# Patient Record
Sex: FEMALE | Race: WHITE | HISPANIC OR LATINO | Employment: FULL TIME | URBAN - NONMETROPOLITAN AREA
[De-identification: names, ages, dates, MRNs, and addresses within clinical notes are randomized per-mention and may not be internally consistent; named-entity substitution may affect disease eponyms.]

---

## 2019-09-04 ENCOUNTER — TRANSCRIBE ORDERS (OUTPATIENT)
Dept: PHYSICAL THERAPY | Facility: CLINIC | Age: 49
End: 2019-09-04

## 2019-09-04 ENCOUNTER — EVALUATION (OUTPATIENT)
Dept: PHYSICAL THERAPY | Facility: CLINIC | Age: 49
End: 2019-09-04
Payer: COMMERCIAL

## 2019-09-04 DIAGNOSIS — Z98.890 STATUS POST OPEN REDUCTION WITH INTERNAL FIXATION (ORIF) OF FRACTURE OF ANKLE: ICD-10-CM

## 2019-09-04 DIAGNOSIS — Z87.81 STATUS POST OPEN REDUCTION WITH INTERNAL FIXATION (ORIF) OF FRACTURE OF ANKLE: ICD-10-CM

## 2019-09-04 DIAGNOSIS — S82.392F FRACTURE OF POSTERIOR MALLEOLUS, LEFT, OPEN TYPE III, WITH ROUTINE HEALING, SUBSEQUENT ENCOUNTER: Primary | ICD-10-CM

## 2019-09-04 PROCEDURE — 97110 THERAPEUTIC EXERCISES: CPT | Performed by: PHYSICAL THERAPIST

## 2019-09-04 PROCEDURE — 97010 HOT OR COLD PACKS THERAPY: CPT | Performed by: PHYSICAL THERAPIST

## 2019-09-04 PROCEDURE — 97140 MANUAL THERAPY 1/> REGIONS: CPT | Performed by: PHYSICAL THERAPIST

## 2019-09-04 PROCEDURE — 97116 GAIT TRAINING THERAPY: CPT | Performed by: PHYSICAL THERAPIST

## 2019-09-04 PROCEDURE — 97162 PT EVAL MOD COMPLEX 30 MIN: CPT | Performed by: PHYSICAL THERAPIST

## 2019-09-04 NOTE — LETTER
2019    Camryn Cui MD  [address]    Patient: Bill Chirinos   YOB: 1970   Date of Visit: 2019     Encounter Diagnosis     ICD-10-CM    1  Fracture of posterior malleolus, left, open type III, with routine healing, subsequent encounter S82 392F    2  Status post open reduction with internal fixation (ORIF) of fracture of ankle Z96 7     Z87 81        Dear Dr Miramontes Slider: Thank you for your recent referral of Bill Chirinos  Please review the attached evaluation summary from [place]'s recent visit  Please verify that you agree with the plan of care by signing the attached order  If you have any questions or concerns, please do not hesitate to call  I sincerely appreciate the opportunity to share in the care of one of your patients and hope to have another opportunity to work with you in the near future  Sincerely,    Ana Santizo, PT      Referring Provider:      I certify that I have read the below Plan of Care and certify the need for these services furnished under this plan of treatment while under my care  Camryn Cui MD  [address]: [phone]          PT Evaluation     Today's date: 2019  Patient name: Bill Chirinos  : 1970  MRN: [id number]  Referring provider: Krissy Graham MD  Dx:   Encounter Diagnosis     ICD-10-CM    1  Fracture of posterior malleolus, left, open type III, with routine healing, subsequent encounter S82 392F    2  Status post open reduction with internal fixation (ORIF) of fracture of ankle Z96 7     Z87 81        Start Time: 1400  Stop Time: 1515  Total time in clinic (min): 75 minutes    Assessment  Assessment details: Bill Chirinos 46 y o  female  Is post ORIF for type III open fracture of distal left tibia 2019   The patient fell at home when her porch collapsed 2019 and she twisted and fractured her ankle acute/chronic history of left right ankle  The patient has been wearing boot since her surgery  She is ambulating with roller walker, and his tentative on bearing weight on her left leg  She has been referred to PT for AROM, stretching, strengthening, and progress in weight bearing, as tolerated  /76 HR 95 Ht  5'8 Wt  235  Left ankle pain rated 4/10 during weight bearing  Left Ankle AROM: DF 0/10  PF 28/50 IV 4/30 EV 2/15     Left ankle Ankle Strength: DF=-4 , PF=-2, IV= 1, EV= 1  Swelling Left ankle  Circumference measurements mid joint  26 3 cm mid foot 24 8  Gait pattern is cautious with decreased step length and speed  Goals  STG:  (1) Reduce left ankle pain by 25% 2 weeks  (2) Reduce and control swelling in  left ankle by 1 cm 2 weeks  (3) increase active ROM right left ankle by 5-8 degrees in all planes 3 weeks  (4) increase strength  left ankle by 1/2 grade 4 weeks  (5) progress in weight bearing per orthopedic recommendations  WBAT  - day one and ongoing  (6) improve gait pattern with  Boot and progress without  Walker - when permitted)    (7) increase ambulation tolerance to 20 min  of continuous walking    LTG  Maximize AROM and strength left ankle by discharge  Promote ambulation without boot and device by discharge  Resume normal functional activity, including driving, work, and recreation    Plan  Plan details:   2-3 X per week 6 weeks  MT - STM, stretching  TE stretching, ROM and strengthening include HEP  GT         Subjective Patient is post ORIF for left ankle fracture     Objective   Left ankle pain rated 4/10 during weight bearing  Left Ankle AROM: DF 0/10  PF 28/50 IV 4/30 EV 2/15     Left ankle Ankle Strength: DF=-4 , PF=-2, IV= 1, EV= 1  Swelling Left ankle  Circumference measurements mid joint  26 3 cm mid foot 24 8                     Manual  9/4       STM 5       AAROM 10       MT                            Exercise Diary  9/4       Ankle pumps 30X       Ankle IV/EV Towel stretch knee straight 5 X 10'       Towel stretch knee bent 5 X 10'               T-band PF LV 2 3/10                                       Gait 10                           Modalities 9/4       Cold pack 10

## 2019-09-04 NOTE — PROGRESS NOTES
PT Evaluation     Today's date: 2019  Patient name: Marcia Mckeon  : 1970  MRN: [id number]  Referring provider: Lou Ascencio MD  Dx:   Encounter Diagnosis     ICD-10-CM    1  Fracture of posterior malleolus, left, open type III, with routine healing, subsequent encounter S82 392F    2  Status post open reduction with internal fixation (ORIF) of fracture of ankle Z96 7     Z87 81        Start Time: 1400  Stop Time: 1515  Total time in clinic (min): 75 minutes    Assessment  Assessment details: Marcia Mckeon 46 y o  female  Is post ORIF for type III open fracture of distal left tibia 2019  The patient fell at home when her porch collapsed 2019 and she twisted and fractured her ankle  acute/chronic history of left right ankle  The patient has been wearing boot since her surgery  She is ambulating with roller walker, and his tentative on bearing weight on her left leg  She has been referred to PT for AROM, stretching, strengthening, and progress in weight bearing, as tolerated  /76 HR 95 Ht  5'8 Wt  235  Left ankle pain rated 4/10 during weight bearing  Left Ankle AROM: DF 0/10  PF 28/50 IV 4/30 EV 2/15     Left ankle Ankle Strength: DF=-4 , PF=-2, IV= 1, EV= 1  Swelling Left ankle  Circumference measurements mid joint  26 3 cm mid foot 24 8  Gait pattern is cautious with decreased step length and speed  Goals  STG:  (1) Reduce left ankle pain by 25% 2 weeks  (2) Reduce and control swelling in  left ankle by 1 cm 2 weeks  (3) increase active ROM right left ankle by 5-8 degrees in all planes 3 weeks  (4) increase strength  left ankle by 1/2 grade 4 weeks  (5) progress in weight bearing per orthopedic recommendations  WBAT  - day one and ongoing  (6) improve gait pattern with  Boot and progress without  Walker - when permitted)    (7) increase ambulation tolerance to 20 min   of continuous walking    LTG  Maximize AROM and strength left ankle by discharge  Promote ambulation without boot and device by discharge  Resume normal functional activity, including driving, work, and recreation    Plan  Plan details:   2-3 X per week 6 weeks  MT - STM, stretching  TE stretching, ROM and strengthening include HEP  GT         Subjective Patient is post ORIF for left ankle fracture     Objective   Left ankle pain rated 4/10 during weight bearing  Left Ankle AROM: DF 0/10  PF 28/50 IV 4/30 EV 2/15     Left ankle Ankle Strength: DF=-4 , PF=-2, IV= 1, EV= 1  Swelling Left ankle  Circumference measurements mid joint  26 3 cm mid foot 24 8                     Manual  9/4       STM 5       AAROM 10       MT                            Exercise Diary  9/4       Ankle pumps 30X       Ankle IV/EV                Towel stretch knee straight 5 X 10'       Towel stretch knee bent 5 X 10'               T-band PF LV 2 3/10                                       Gait 10                           Modalities 9/4       Cold pack 10

## 2019-09-06 ENCOUNTER — OFFICE VISIT (OUTPATIENT)
Dept: PHYSICAL THERAPY | Facility: CLINIC | Age: 49
End: 2019-09-06
Payer: COMMERCIAL

## 2019-09-06 DIAGNOSIS — Z87.81 STATUS POST OPEN REDUCTION WITH INTERNAL FIXATION (ORIF) OF FRACTURE OF ANKLE: ICD-10-CM

## 2019-09-06 DIAGNOSIS — Z98.890 STATUS POST OPEN REDUCTION WITH INTERNAL FIXATION (ORIF) OF FRACTURE OF ANKLE: ICD-10-CM

## 2019-09-06 DIAGNOSIS — S82.392F FRACTURE OF POSTERIOR MALLEOLUS, LEFT, OPEN TYPE III, WITH ROUTINE HEALING, SUBSEQUENT ENCOUNTER: Primary | ICD-10-CM

## 2019-09-06 PROCEDURE — 97010 HOT OR COLD PACKS THERAPY: CPT | Performed by: PHYSICAL THERAPIST

## 2019-09-06 PROCEDURE — 97140 MANUAL THERAPY 1/> REGIONS: CPT | Performed by: PHYSICAL THERAPIST

## 2019-09-06 PROCEDURE — 97110 THERAPEUTIC EXERCISES: CPT | Performed by: PHYSICAL THERAPIST

## 2019-09-06 NOTE — PROGRESS NOTES
Daily Note     Today's date: 2019  Patient name: Bill Chirinos  : 1970  MRN: [id number]  Referring provider: Krissy Graham MD  Dx:   Encounter Diagnosis     ICD-10-CM    1  Fracture of posterior malleolus, left, open type III, with routine healing, subsequent encounter S82 392F    2  Status post open reduction with internal fixation (ORIF) of fracture of ankle Z96 7     Z87 81        Start Time: 1030  Stop Time: 1130  Total time in clinic (min): 60 minutes    Subjective: Patient has been performing HEP  She is more confident with ambulation using roller walker      Objective: PT repeated MT and directed exercises  Assessment: Tolerated treatment well  Plan: Continue per plan of care        Manual           STM 5  10         AAROM 10  5         MT    5                                           Exercise Diary           Ankle pumps 30X  30X         Ankle IV/EV    30X/30X                       Towel stretch knee straight 5 X 10'  5X 20'         Towel stretch knee bent 5 X 10'  5X 20'          T-band DF    LV2 3/10         T-band PF LV 2 3/10  LV2 3/10          T-band EV/IV    LV2 2/10@                                                   Gait 10                                             Modalities          Cold pack 10  10

## 2019-09-09 ENCOUNTER — OFFICE VISIT (OUTPATIENT)
Dept: PHYSICAL THERAPY | Facility: CLINIC | Age: 49
End: 2019-09-09
Payer: COMMERCIAL

## 2019-09-09 DIAGNOSIS — Z87.81 STATUS POST OPEN REDUCTION WITH INTERNAL FIXATION (ORIF) OF FRACTURE OF ANKLE: ICD-10-CM

## 2019-09-09 DIAGNOSIS — S82.392F FRACTURE OF POSTERIOR MALLEOLUS, LEFT, OPEN TYPE III, WITH ROUTINE HEALING, SUBSEQUENT ENCOUNTER: Primary | ICD-10-CM

## 2019-09-09 DIAGNOSIS — Z98.890 STATUS POST OPEN REDUCTION WITH INTERNAL FIXATION (ORIF) OF FRACTURE OF ANKLE: ICD-10-CM

## 2019-09-09 PROCEDURE — 97110 THERAPEUTIC EXERCISES: CPT | Performed by: PHYSICAL THERAPIST

## 2019-09-09 PROCEDURE — 97140 MANUAL THERAPY 1/> REGIONS: CPT | Performed by: PHYSICAL THERAPIST

## 2019-09-09 PROCEDURE — 97010 HOT OR COLD PACKS THERAPY: CPT | Performed by: PHYSICAL THERAPIST

## 2019-09-09 NOTE — PROGRESS NOTES
Daily Note     Today's date: 2019  Patient name: Nikole Zaman  : 1970  MRN: [id number]  Referring provider: Jones Nielson MD  Dx:   Encounter Diagnosis     ICD-10-CM    1  Fracture of posterior malleolus, left, open type III, with routine healing, subsequent encounter S82 392F    2  Status post open reduction with internal fixation (ORIF) of fracture of ankle Z96 7     Z87 81        Start Time: 1500  Stop Time: 1605  Total time in clinic (min): 65 minutes    Subjective: Patient returns to Dr Alma Quintero this evening  She will question whether or not she can progress beyond use of boot      Objective: PT performed MT and active-assisted ROM in all planes to left ankle  Left Ankle AROM: DF 2/10  PF 30/50 IV  EV 6/15             Assessment: Tolerated treatment well  Patient continues to ambulate with roller walker and left LE boot  She is more tolerant to weight bearing      Plan: Continue per plan of care        Manual         STM 5  10  10       AAROM 10  5  5       MT    5  5                                         Exercise Diary         Ankle pumps 30X  30X  30X       Ankle IV/EV    30X/30X  30X / 30X                     Towel stretch knee straight 5 X 10'  5X 20'  5X 20'       Towel stretch knee bent 5 X 10'  5X 20'  5X 20'        T-band DF    LV2 3/10  LV2 3/10       T-band PF LV 2 3/10  LV2 3/10  LV2 3/10        T-band EV/IV    LV2 2/10@  LV2 3/10                                                 Gait 10                                             Modalities        Cold pack 10  10 10

## 2019-09-11 ENCOUNTER — APPOINTMENT (OUTPATIENT)
Dept: PHYSICAL THERAPY | Facility: CLINIC | Age: 49
End: 2019-09-11
Payer: COMMERCIAL

## 2019-09-12 ENCOUNTER — OFFICE VISIT (OUTPATIENT)
Dept: PHYSICAL THERAPY | Facility: CLINIC | Age: 49
End: 2019-09-12
Payer: COMMERCIAL

## 2019-09-12 DIAGNOSIS — S82.392F FRACTURE OF POSTERIOR MALLEOLUS, LEFT, OPEN TYPE III, WITH ROUTINE HEALING, SUBSEQUENT ENCOUNTER: Primary | ICD-10-CM

## 2019-09-12 DIAGNOSIS — Z87.81 STATUS POST OPEN REDUCTION WITH INTERNAL FIXATION (ORIF) OF FRACTURE OF ANKLE: ICD-10-CM

## 2019-09-12 DIAGNOSIS — Z98.890 STATUS POST OPEN REDUCTION WITH INTERNAL FIXATION (ORIF) OF FRACTURE OF ANKLE: ICD-10-CM

## 2019-09-12 PROCEDURE — 97116 GAIT TRAINING THERAPY: CPT | Performed by: PHYSICAL THERAPIST

## 2019-09-12 PROCEDURE — 97110 THERAPEUTIC EXERCISES: CPT | Performed by: PHYSICAL THERAPIST

## 2019-09-12 PROCEDURE — 97140 MANUAL THERAPY 1/> REGIONS: CPT | Performed by: PHYSICAL THERAPIST

## 2019-09-12 NOTE — PROGRESS NOTES
Daily Note     Today's date: 2019  Patient name: Reba Casey  : 1970  MRN: [id number]  Referring provider: Lillian Mary MD  Dx:   Encounter Diagnosis     ICD-10-CM    1  Fracture of posterior malleolus, left, open type III, with routine healing, subsequent encounter S82 392F    2  Status post open reduction with internal fixation (ORIF) of fracture of ankle Z96 7     Z87 81        Start Time: 1330  Stop Time: 1435  Total time in clinic (min): 65 minutes    Subjective: Patient was examined by Dr Tejas Urena 2019  X-rays showed that hardware is intact without signs of loosening   Alignment is stable   No new fractures   Dr Tejas Urena recommended that patient continue PT, and continue use of boot, but may practice some weight bearing in her sneaker  Objective: PT directed ROM exercises; added quad strengthening exercises  Patient did perform weight shifting exercises with sneaker on left foot, and attempted short distance walking using with a "step-to" gait pattern      Assessment: Tolerated treatment well  Plan: Continue per plan of care        Manual       STM 5  10  10  5     AAROM 10  5  5  5     MT    5  5  5                                       Exercise Diary       Ankle pumps 30X  30X  30X  30X  #1     Ankle IV/EV    30X/30X  30X / 30X  30 X / 30X #1                   Towel stretch knee straight 5 X 10'  5X 20'  5X 20'  5 X 20'     Towel stretch knee bent 5 X 10'  5X 20'  5X 20'  5 X 20'      T-band DF    LV2 310  LV2 3/10  LV2 310     T-band PF LV 2 310  LV2 310  LV2 3/10  LV2 310      T-band EV/IV    LV2 2/10@  LV2 3/10  LV2 310      SAQ        3# 3/10      LAQ        3# 3/10                   Gait 10             weight shift        5       walker/sneaker       5           Modalities      Cold pack 10  10 10 declined

## 2019-09-16 ENCOUNTER — OFFICE VISIT (OUTPATIENT)
Dept: PHYSICAL THERAPY | Facility: CLINIC | Age: 49
End: 2019-09-16
Payer: COMMERCIAL

## 2019-09-16 DIAGNOSIS — Z98.890 STATUS POST OPEN REDUCTION WITH INTERNAL FIXATION (ORIF) OF FRACTURE OF ANKLE: ICD-10-CM

## 2019-09-16 DIAGNOSIS — Z87.81 STATUS POST OPEN REDUCTION WITH INTERNAL FIXATION (ORIF) OF FRACTURE OF ANKLE: ICD-10-CM

## 2019-09-16 DIAGNOSIS — S82.392F FRACTURE OF POSTERIOR MALLEOLUS, LEFT, OPEN TYPE III, WITH ROUTINE HEALING, SUBSEQUENT ENCOUNTER: Primary | ICD-10-CM

## 2019-09-16 PROCEDURE — 97110 THERAPEUTIC EXERCISES: CPT

## 2019-09-16 PROCEDURE — 97140 MANUAL THERAPY 1/> REGIONS: CPT

## 2019-09-16 NOTE — PROGRESS NOTES
Daily Note     Today's date: 2019  Patient name: Ramiro Danielle  : 1970  MRN: [id number]  Referring provider: Steve Garcia MD  Dx:   Encounter Diagnosis     ICD-10-CM    1  Fracture of posterior malleolus, left, open type III, with routine healing, subsequent encounter S82 392F    2  Status post open reduction with internal fixation (ORIF) of fracture of ankle Z96 7     Z87 81        Start Time: 1350  Stop Time: 1445  Total time in clinic (min): 55 minutes    Subjective: Patient continues to ambulate with boot, no c/o pain in foot at this time  Objective: PTA directed patient in LE strengthening and ROM exercise program, See treatment diary below  Held on gait training as shoe was not present  Assessment: Tolerated treatment well  Patient exhibited good technique with therapeutic exercises  Will bring shoe for gait training next session  Plan: Continue per plan of care        Manual     STM 5  10  10  5  5   AAROM 10  5  5  5  5   MT    5  5  5  5                                     Exercise Diary     Ankle pumps 30X  30X  30X  30X  #1  30x 1#   Ankle IV/EV    30X/30X  30X / 30X  30 X / 30X #1  30x 30 1#                 Towel stretch knee straight 5 X 10'  5X 20'  5X 20'  5 X 20'  5x 20"   Towel stretch knee bent 5 X 10'  5X 20'  5X 20'  5 X 20'  5x 20"    T-band DF    LV2 3/10  LV2 3/10  LV2 3/10  L2 3/10   T-band PF LV 2 3/10  LV2 3/10  LV2 3/10  LV2 3/10  L2 3/10    T-band EV/IV    LV2 2/10@  LV2 3/10  LV2 3/10  L2 3/10    SAQ        3# 3/10  3# 3/10    LAQ        3# 3/10  3# 3/10                 Gait 10             weight shift        5  -->     walker/sneaker       5  -->         Modalities    Cold pack 10  10 10 declined Declined

## 2019-09-18 ENCOUNTER — OFFICE VISIT (OUTPATIENT)
Dept: PHYSICAL THERAPY | Facility: CLINIC | Age: 49
End: 2019-09-18
Payer: COMMERCIAL

## 2019-09-18 DIAGNOSIS — Z87.81 STATUS POST OPEN REDUCTION WITH INTERNAL FIXATION (ORIF) OF FRACTURE OF ANKLE: ICD-10-CM

## 2019-09-18 DIAGNOSIS — S82.392F FRACTURE OF POSTERIOR MALLEOLUS, LEFT, OPEN TYPE III, WITH ROUTINE HEALING, SUBSEQUENT ENCOUNTER: Primary | ICD-10-CM

## 2019-09-18 DIAGNOSIS — Z98.890 STATUS POST OPEN REDUCTION WITH INTERNAL FIXATION (ORIF) OF FRACTURE OF ANKLE: ICD-10-CM

## 2019-09-18 PROCEDURE — 97140 MANUAL THERAPY 1/> REGIONS: CPT | Performed by: PHYSICAL THERAPIST

## 2019-09-18 PROCEDURE — 97110 THERAPEUTIC EXERCISES: CPT | Performed by: PHYSICAL THERAPIST

## 2019-09-18 PROCEDURE — 97116 GAIT TRAINING THERAPY: CPT | Performed by: PHYSICAL THERAPIST

## 2019-09-18 NOTE — PROGRESS NOTES
Daily Note     Today's date: 2019  Patient name: Hilario Wesley  : 1970  MRN: [id number]  Referring provider: Brad Sadler MD  Dx:   Encounter Diagnosis     ICD-10-CM    1  Fracture of posterior malleolus, left, open type III, with routine healing, subsequent encounter S82 392F    2  Status post open reduction with internal fixation (ORIF) of fracture of ankle Z96 7     Z87 81        Start Time: 1400  Stop Time: 1455  Total time in clinic (min): 55 minutes    Subjective: Pt reports "I'm okay, just a little pain "       Objective: See treatment diary below      Assessment: Tolerated treatment well  Patient exhibited good technique with therapeutic exercises and would benefit from continued PT  Pt with good tolerance to exercises this visit  Able to ambulate wearing sneaker on left foot  Occasional VC required for proper gait mechanics  Minor pain reported during gait training  Continue to progress as tolerated to achieve set goals  Plan: Continue per plan of care  Progress treatment as tolerated         Manual     STM 5  10  10  5  5   AAROM 5  5  5  5  5   MT 5  5  5  5  5                                   Exercise Diary     Ankle pumps 30x 1#  30X  30X  30X  #1  30x 1#   Ankle IV/EV 30x ea 1#  30X/30X  30X / 30X  30 X / 30X #1  30x 30 1#                Towel stretch knee straight 5x20"  5X 20'  5X 20'  5 X 20'  5x 20"   Towel stretch knee bent 5x20"  5X 20'  5X 20'  5 X 20'  5x 20"    T-band DF L2 3x10  LV2 3/10  LV2 3/10  LV2 3/10  L2 3/10   T-band PF L2 3x10  LV2 3/10  LV2 3/10  LV2 3/10  L2 3/10    T-band EV/IV L2 3x10  LV2 2/10@  LV2 3/10  LV2 3/10  L2 3/10    SAQ 3x10 3#      3# 3/10  3# 3/10    LAQ 3x10 3#      3# 3/10  3# 3/10                Gait              weight shift 5 min       5  -->     walker/sneaker 5 min      5  -->         Modalities    Cold pack Declined   10 10 declined Declined

## 2019-09-19 ENCOUNTER — APPOINTMENT (OUTPATIENT)
Dept: PHYSICAL THERAPY | Facility: CLINIC | Age: 49
End: 2019-09-19
Payer: COMMERCIAL

## 2019-09-23 ENCOUNTER — OFFICE VISIT (OUTPATIENT)
Dept: PHYSICAL THERAPY | Facility: CLINIC | Age: 49
End: 2019-09-23
Payer: COMMERCIAL

## 2019-09-23 DIAGNOSIS — S82.392F FRACTURE OF POSTERIOR MALLEOLUS, LEFT, OPEN TYPE III, WITH ROUTINE HEALING, SUBSEQUENT ENCOUNTER: Primary | ICD-10-CM

## 2019-09-23 DIAGNOSIS — Z98.890 STATUS POST OPEN REDUCTION WITH INTERNAL FIXATION (ORIF) OF FRACTURE OF ANKLE: ICD-10-CM

## 2019-09-23 DIAGNOSIS — Z87.81 STATUS POST OPEN REDUCTION WITH INTERNAL FIXATION (ORIF) OF FRACTURE OF ANKLE: ICD-10-CM

## 2019-09-23 PROCEDURE — 97110 THERAPEUTIC EXERCISES: CPT

## 2019-09-23 PROCEDURE — 97116 GAIT TRAINING THERAPY: CPT

## 2019-09-23 PROCEDURE — 97140 MANUAL THERAPY 1/> REGIONS: CPT

## 2019-09-23 NOTE — PROGRESS NOTES
Daily Note     Today's date: 2019  Patient name: Landy Cordero  : 1970  MRN: [id number]  Referring provider: Mak Quezada MD  Dx:   Encounter Diagnosis     ICD-10-CM    1  Fracture of posterior malleolus, left, open type III, with routine healing, subsequent encounter S82 392F    2  Status post open reduction with internal fixation (ORIF) of fracture of ankle Z96 7     Z87 81                   Subjective: Pt states she has mild/moderate discomfort with weightbearing while in the boot  Objective: See treatment diary below  Initiated Nu-step per PT(TK) approval        Assessment: Tolerated treatment fair  Patient would benefit from continued PT      Plan: Continue per plan of care        Manual     STM 5  5  10  5  5   AAROM 5  5  5  5  5   MT 5  5  5  5  5                                   Exercise Diary     Ankle pumps 30x 1# 1 5# 30x  30X  30X  #1  30x 1#   Ankle IV/EV 30x ea 1# 1 5# 30x  30X / 30X  30 X / 30X #1  30x 30 1#                Towel stretch knee straight 5x20"  5x 20"  5X 20'  5 X 20'  5x 20"   Towel stretch knee bent 5x20"  5x 20"  5X 20'  5 X 20'  5x 20"    T-band DF L2 3x10  L3 3/10  LV2 3/10  LV2 3/10  L2 3/10   T-band PF L2 3x10  L3 3/10  LV2 3/10  LV2 3/10  L2 3/10    T-band EV/IV L2 3x10  L3 2/10  LV2 3/10  LV2 3/10  L2 3/10    SAQ 3x10 3#  3# 3x10    3# 3/10  3# 3/10    LAQ 3x10 3#  3# 3/10    3# 3/10  3# 3/10    Nu-step   3m L1         Gait              weight shift 5 min  5m    5  -->     walker/sneaker 5 min   5m   5  -->         Modalities    Cold pack Declined   10 10 declined Declined

## 2019-09-25 ENCOUNTER — OFFICE VISIT (OUTPATIENT)
Dept: PHYSICAL THERAPY | Facility: CLINIC | Age: 49
End: 2019-09-25
Payer: COMMERCIAL

## 2019-09-25 DIAGNOSIS — Z87.81 STATUS POST OPEN REDUCTION WITH INTERNAL FIXATION (ORIF) OF FRACTURE OF ANKLE: ICD-10-CM

## 2019-09-25 DIAGNOSIS — S82.392F FRACTURE OF POSTERIOR MALLEOLUS, LEFT, OPEN TYPE III, WITH ROUTINE HEALING, SUBSEQUENT ENCOUNTER: Primary | ICD-10-CM

## 2019-09-25 DIAGNOSIS — Z98.890 STATUS POST OPEN REDUCTION WITH INTERNAL FIXATION (ORIF) OF FRACTURE OF ANKLE: ICD-10-CM

## 2019-09-25 PROCEDURE — 97110 THERAPEUTIC EXERCISES: CPT

## 2019-09-25 PROCEDURE — 97140 MANUAL THERAPY 1/> REGIONS: CPT

## 2019-09-25 PROCEDURE — 97116 GAIT TRAINING THERAPY: CPT

## 2019-09-25 NOTE — PROGRESS NOTES
Daily Note     Today's date: 2019  Patient name: Landy Cordero  : 1970  MRN: [id number]  Referring provider: Mak Quezada MD  Dx:   Encounter Diagnosis     ICD-10-CM    1  Fracture of posterior malleolus, left, open type III, with routine healing, subsequent encounter S82 392F    2  Status post open reduction with internal fixation (ORIF) of fracture of ankle Z96 7     Z87 81        Start Time: 1330  Stop Time: 1440  Total time in clinic (min): 70 minutes    Subjective: Pt reports no increased pain after start of the Nu-step last visit  Objective: See treatment diary below      Assessment: Tolerated treatment fair  Patient demonstrated fatigue post treatment      Plan: Continue per plan of care        Manual     STM 5  5  5  5  5   AAROM 5  5  5  5  5   MT 5  5  5  5  5                                   Exercise Diary     Ankle pumps 30x 1# 1 5# 30x  1 5# 30x  30X  #1  30x 1#   Ankle IV/EV 30x ea 1# 1 5# 30x  1 5# 30x  30 X / 30X #1  30x 30 1#                Towel stretch knee straight 5x20"  5x 20"  5X 20"  5 X 20'  5x 20"   Towel stretch knee bent 5x20"  5x 20"  5X 20"  5 X 20'  5x 20"    T-band DF L2 3x10  L3 3/10  L3 3/10  LV2 3/10  L2 3/10   T-band PF L2 3x10  L3 3/10  L3 3/10  LV2 3/10  L2 3/10    T-band EV/IV L2 3x10  L3 2/10  L3 3/10  LV2 3/10  L2 3/10    SAQ 3x10 3#  3# 3x10  3# 3/10  3# 3/10  3# 3/10    LAQ 3x10 3#  3# 3/10  3# 3/10  3# 3/10  3# 3/10    Nu-step   3m L1  5m L1       Gait              weight shift 5 min  5m  5m    -->     walker/sneaker 5 min   5m  5m   -->         Modalities    Cold pack Declined   10 10 declined Declined

## 2019-09-30 ENCOUNTER — OFFICE VISIT (OUTPATIENT)
Dept: PHYSICAL THERAPY | Facility: CLINIC | Age: 49
End: 2019-09-30
Payer: COMMERCIAL

## 2019-09-30 DIAGNOSIS — S82.392F FRACTURE OF POSTERIOR MALLEOLUS, LEFT, OPEN TYPE III, WITH ROUTINE HEALING, SUBSEQUENT ENCOUNTER: Primary | ICD-10-CM

## 2019-09-30 DIAGNOSIS — Z87.81 STATUS POST OPEN REDUCTION WITH INTERNAL FIXATION (ORIF) OF FRACTURE OF ANKLE: ICD-10-CM

## 2019-09-30 DIAGNOSIS — Z98.890 STATUS POST OPEN REDUCTION WITH INTERNAL FIXATION (ORIF) OF FRACTURE OF ANKLE: ICD-10-CM

## 2019-09-30 PROCEDURE — 97140 MANUAL THERAPY 1/> REGIONS: CPT

## 2019-09-30 PROCEDURE — 97110 THERAPEUTIC EXERCISES: CPT

## 2019-09-30 NOTE — LETTER
2019    Mike Garcia MD  [address]    Patient: David Mosqueda   YOB: 1970   Date of Visit: 2019     Encounter Diagnosis     ICD-10-CM    1  Fracture of posterior malleolus, left, open type III, with routine healing, subsequent encounter S82 392F    2  Status post open reduction with internal fixation (ORIF) of fracture of ankle Z96 7     Z87 81        Dear Dr Mirtha Dumont: Thank you for your recent referral of David Mosqueda  Please review the attached evaluation summary from [place]'s recent visit  Please verify that you agree with the plan of care by signing the attached order  If you have any questions or concerns, please do not hesitate to call  I sincerely appreciate the opportunity to share in the care of one of your patients and hope to have another opportunity to work with you in the near future  Sincerely,    Edilia Reid, PT      Referring Provider:      I certify that I have read the below Plan of Care and certify the need for these services furnished under this plan of treatment while under my care  Mike Garcia MD  [address] [place] [address] Street: [phone]          Reassessment     Today's date: 2019  Patient name: David Mosqueda  : 1970  MRN: [id number]  Referring provider: Tia Booth MD  Dx:   Encounter Diagnosis     ICD-10-CM    1  Fracture of posterior malleolus, left, open type III, with routine healing, subsequent encounter S82 392F    2  Status post open reduction with internal fixation (ORIF) of fracture of ankle Z96 7     Z87 81        Start Time: 1313          Subjective: Pain level 5-6/10      Objective: See treatment diary below      Assessment: Tolerated treatment well and showing slow progress but definitly improving   Patient exhibited good technique with therapeutic exercises and would benefit from continued PT     Assessment  Assessment details: Jon Reed 46 y o  female  Is post ORIF for type III open fracture of distal left tibia 6/24/2019  The patient fell at home when her porch collapsed 6/12/2019 and she twisted and fractured her ankle  acute/chronic history of left right ankle  The patient has been wearing boot since her surgery  She continues to ambulate with a rollator walker and boot  She is increasing in confidence in her weightbearing on the left leg  She has been seen for 9 out patient therapy visits for AROM, stretching, strengthening, and progress in weight bearing, as tolerated  /76 HR 95 Ht  5'8 Wt  235  Left ankle pain rated 4/10 during weight bearing  And 7-8/10 with stretching  Left Ankle AROM: DF 10  PF  40 IV 6/30 EV 5/15           Left ankle Ankle Strength: DF=-4 , PF=-2+, IV= 2, EV= 2  Swelling Left ankle  Circumference measurements mid joint  26 3 cm mid foot 24 8  Gait pattern remains cautious with decreased step length on right and decreased speed  Goals  STG:  (1) Reduce left ankle pain by 25% 2 weeks progressing  (2) Reduce and control swelling in  left ankle by 1 cm 2 weeks  (3) increase active ROM right left ankle by 5-8 degrees in all planes 3 weeks progressing  (4) increase strength  left ankle by 1/2 grade 4 weeks met   (5) progress in weight bearing per orthopedic recommendations  WBAT  - day one and ongoing progressing  (6) improve gait pattern with  Boot and progress without  Walker - when permitted)    (7) increase ambulation tolerance to 20 min  of continuous walking    LTG  Maximize AROM and strength left ankle by discharge  Promote ambulation without boot and device by discharge  Resume normal functional activity, including driving, work, and recreation   Plan  Plan details:   2-3 X per week 6 - 8 weeks  MT - STM, stretching  TE stretching, ROM and strengthening include HEP  GT             Plan: Continue per plan of care  Progress treatment as tolerated  reassessment performed   See progress note to MD     Manual   9/23 9/25 9/12 9/16 9/30   STM  5  5  5  5 5   AAROM  5  5  5  5 5   MT  5  5  5  5 5                                   Exercise Diary   9/23 9/25 9/12 9/16 9/30   Ankle pumps 1 5# 30x  1 5# 30x  30X  #1  30x 1# #1 x 30   Ankle IV/EV 1 5# 30x  1 5# 30x  30 X / 30X #1  30x 30 1# #1 x 30                Towel stretch knee straight  5x 20"  5X 20"  5 X 20'  5x 20" 5 x 20"   Towel stretch knee bent  5x 20"  5X 20"  5 X 20'  5x 20" 5 x 20"    T-band DF  L3 3/10  L3 3/10  LV2 3/10  L2 3/10 L3 x 30   T-band PF  L3 3/10  L3 3/10  LV2 3/10  L2 3/10 L3 x 30    T-band EV/IV  L3 2/10  L3 3/10  LV2 3/10  L2 3/10 L3 x 30    SAQ  3# 3x10  3# 3/10  3# 3/10  3# 3/10 #4 x 30    LAQ  3# 3/10  3# 3/10  3# 3/10  3# 3/10 #4 x 30    Nu-step  3m L1  5m L1        Standing toe ups     X 5   Standing mini squats     X 5   Gait              weight shift 5m  5m    -->      walker/sneaker  5m  5m   --> 5 min         Modalities 9/18 9/23 9/25 9/12 9/16   Cold pack Declined   10 10 declined Declined

## 2019-09-30 NOTE — PROGRESS NOTES
Reassessment     Today's date: 2019  Patient name: Clem Serrano  : 1970  MRN: [id number]  Referring provider: Albert Saucedo MD  Dx:   Encounter Diagnosis     ICD-10-CM    1  Fracture of posterior malleolus, left, open type III, with routine healing, subsequent encounter S82 392F    2  Status post open reduction with internal fixation (ORIF) of fracture of ankle Z96 7     Z87 81        Start Time: 1313          Subjective: Pain level 5-6/10      Objective: See treatment diary below      Assessment: Tolerated treatment well and showing slow progress but definitly improving  Patient exhibited good technique with therapeutic exercises and would benefit from continued PT     Assessment  Assessment details: Clem Serrano 46 y o  female  Is post ORIF for type III open fracture of distal left tibia 2019  The patient fell at home when her porch collapsed 2019 and she twisted and fractured her ankle  acute/chronic history of left right ankle  The patient has been wearing boot since her surgery  She continues to ambulate with a rollator walker and boot  She is increasing in confidence in her weightbearing on the left leg  She has been seen for 9 out patient therapy visits for AROM, stretching, strengthening, and progress in weight bearing, as tolerated  /76 HR 95 Ht  5'8 Wt  235  Left ankle pain rated 4/10 during weight bearing  And 7-8/10 with stretching  Left Ankle AROM: DF 10  PF 40 IV 6/30 EV 5/15           Left ankle Ankle Strength: DF=-4 , PF=-2+, IV= 2, EV= 2  Swelling Left ankle  Circumference measurements mid joint  26 3 cm mid foot 24 8  Gait pattern remains cautious with decreased step length on right and decreased speed        Goals  STG:  (1) Reduce left ankle pain by 25% 2 weeks progressing  (2) Reduce and control swelling in  left ankle by 1 cm 2 weeks  (3) increase active ROM right left ankle by 5-8 degrees in all planes 3 weeks progressing  (4) increase strength  left ankle by 1/2 grade 4 weeks met   (5) progress in weight bearing per orthopedic recommendations  WBAT  - day one and ongoing progressing  (6) improve gait pattern with  Boot and progress without  Walker - when permitted)    (7) increase ambulation tolerance to 20 min  of continuous walking    LTG  Maximize AROM and strength left ankle by discharge  Promote ambulation without boot and device by discharge  Resume normal functional activity, including driving, work, and recreation   Plan  Plan details:   2-3 X per week 6 - 8 weeks  MT - STM, stretching  TE stretching, ROM and strengthening include HEP  GT             Plan: Continue per plan of care  Progress treatment as tolerated  reassessment performed   See progress note to MD     Manual   9/23 9/25 9/12 9/16 9/30   STM  5  5  5  5 5   AAROM  5  5  5  5 5   MT  5  5  5  5 5                                   Exercise Diary   9/23 9/25 9/12 9/16 9/30   Ankle pumps 1 5# 30x  1 5# 30x  30X  #1  30x 1# #1 x 30   Ankle IV/EV 1 5# 30x  1 5# 30x  30 X / 30X #1  30x 30 1# #1 x 30                Towel stretch knee straight  5x 20"  5X 20"  5 X 20'  5x 20" 5 x 20"   Towel stretch knee bent  5x 20"  5X 20"  5 X 20'  5x 20" 5 x 20"    T-band DF  L3 3/10  L3 3/10  LV2 3/10  L2 3/10 L3 x 30   T-band PF  L3 3/10  L3 3/10  LV2 3/10  L2 3/10 L3 x 30    T-band EV/IV  L3 2/10  L3 3/10  LV2 3/10  L2 3/10 L3 x 30    SAQ  3# 3x10  3# 3/10  3# 3/10  3# 3/10 #4 x 30    LAQ  3# 3/10  3# 3/10  3# 3/10  3# 3/10 #4 x 30    Nu-step  3m L1  5m L1        Standing toe ups     X 5   Standing mini squats     X 5   Gait              weight shift 5m  5m    -->      walker/sneaker  5m  5m   --> 5 min         Modalities 9/18 9/23 9/25 9/12 9/16   Cold pack Declined   10 10 declined Declined

## 2019-09-30 NOTE — LETTER
2019    Stef Hylton MD  [address]    Patient: Som Bo   YOB: 1970   Date of Visit: 2019     Encounter Diagnosis     ICD-10-CM    1  Fracture of posterior malleolus, left, open type III, with routine healing, subsequent encounter S82 392F    2  Status post open reduction with internal fixation (ORIF) of fracture of ankle Z96 7     Z87 81        Dear Dr Corrinne Adolphus: Thank you for your recent referral of Som Bo  Please review the attached evaluation summary from [place]'s recent visit  Please verify that you agree with the plan of care by signing the attached order  If you have any questions or concerns, please do not hesitate to call  I sincerely appreciate the opportunity to share in the care of one of your patients and hope to have another opportunity to work with you in the near future  Sincerely,    Alex Najera, PT      Referring Provider:      I certify that I have read the below Plan of Care and certify the need for these services furnished under this plan of treatment while under my care  Stef Hylton MD  [address] [place] [address] Street: [phone]          Reassessment     Today's date: 2019  Patient name: Som Bo  : 1970  MRN: [id number]  Referring provider: Edna Segura MD  Dx:   Encounter Diagnosis     ICD-10-CM    1  Fracture of posterior malleolus, left, open type III, with routine healing, subsequent encounter S82 392F    2  Status post open reduction with internal fixation (ORIF) of fracture of ankle Z96 7     Z87 81        Start Time: 1313          Subjective: Pain level 5-6/10      Objective: See treatment diary below      Assessment: Tolerated treatment well and showing slow progress but definitly improving   Patient exhibited good technique with therapeutic exercises and would benefit from continued PT     Assessment  Assessment details: Oswaldo Asher 46 y o  female  Is post ORIF for type III open fracture of distal left tibia 6/24/2019  The patient fell at home when her porch collapsed 6/12/2019 and she twisted and fractured her ankle  acute/chronic history of left right ankle  The patient has been wearing boot since her surgery  She continues to ambulate with a rollator walker and boot  She is increasing in confidence in her weightbearing on the left leg  She has been seen for 9 out patient therapy visits for AROM, stretching, strengthening, and progress in weight bearing, as tolerated  /76 HR 95 Ht  5'8 Wt  235  Left ankle pain rated 4/10 during weight bearing  And 7-8/10 with stretching  Left Ankle AROM: DF 10  PF  40 IV 6/30 EV 5/15           Left ankle Ankle Strength: DF=-4 , PF=-2+, IV= 2, EV= 2  Swelling Left ankle  Circumference measurements mid joint  26 3 cm mid foot 24 8  Gait pattern remains cautious with decreased step length on right and decreased speed  Goals  STG:  (1) Reduce left ankle pain by 25% 2 weeks progressing  (2) Reduce and control swelling in  left ankle by 1 cm 2 weeks  (3) increase active ROM right left ankle by 5-8 degrees in all planes 3 weeks progressing  (4) increase strength  left ankle by 1/2 grade 4 weeks met   (5) progress in weight bearing per orthopedic recommendations  WBAT  - day one and ongoing progressing  (6) improve gait pattern with  Boot and progress without  Walker - when permitted)    (7) increase ambulation tolerance to 20 min  of continuous walking    LTG  Maximize AROM and strength left ankle by discharge  Promote ambulation without boot and device by discharge  Resume normal functional activity, including driving, work, and recreation   Plan  Plan details:   2-3 X per week 6 - 8 weeks  MT - STM, stretching  TE stretching, ROM and strengthening include HEP  GT             Plan: Continue per plan of care  Progress treatment as tolerated  reassessment performed   See progress note to MD     Manual   9/23 9/25 9/12 9/16 9/30   STM  5  5  5  5 5   AAROM  5  5  5  5 5   MT  5  5  5  5 5                                   Exercise Diary   9/23 9/25 9/12 9/16 9/30   Ankle pumps 1 5# 30x  1 5# 30x  30X  #1  30x 1# #1 x 30   Ankle IV/EV 1 5# 30x  1 5# 30x  30 X / 30X #1  30x 30 1# #1 x 30                Towel stretch knee straight  5x 20"  5X 20"  5 X 20'  5x 20" 5 x 20"   Towel stretch knee bent  5x 20"  5X 20"  5 X 20'  5x 20" 5 x 20"    T-band DF  L3 3/10  L3 3/10  LV2 3/10  L2 3/10 L3 x 30   T-band PF  L3 3/10  L3 3/10  LV2 3/10  L2 3/10 L3 x 30    T-band EV/IV  L3 2/10  L3 3/10  LV2 3/10  L2 3/10 L3 x 30    SAQ  3# 3x10  3# 3/10  3# 3/10  3# 3/10 #4 x 30    LAQ  3# 3/10  3# 3/10  3# 3/10  3# 3/10 #4 x 30    Nu-step  3m L1  5m L1        Standing toe ups     X 5   Standing mini squats     X 5   Gait              weight shift 5m  5m    -->      walker/sneaker  5m  5m   --> 5 min         Modalities 9/18 9/23 9/25 9/12 9/16   Cold pack Declined   10 10 declined Declined

## 2019-10-03 ENCOUNTER — OFFICE VISIT (OUTPATIENT)
Dept: PHYSICAL THERAPY | Facility: CLINIC | Age: 49
End: 2019-10-03
Payer: COMMERCIAL

## 2019-10-03 DIAGNOSIS — Z98.890 STATUS POST OPEN REDUCTION WITH INTERNAL FIXATION (ORIF) OF FRACTURE OF ANKLE: ICD-10-CM

## 2019-10-03 DIAGNOSIS — S82.392F FRACTURE OF POSTERIOR MALLEOLUS, LEFT, OPEN TYPE III, WITH ROUTINE HEALING, SUBSEQUENT ENCOUNTER: Primary | ICD-10-CM

## 2019-10-03 DIAGNOSIS — Z87.81 STATUS POST OPEN REDUCTION WITH INTERNAL FIXATION (ORIF) OF FRACTURE OF ANKLE: ICD-10-CM

## 2019-10-03 PROCEDURE — 97140 MANUAL THERAPY 1/> REGIONS: CPT

## 2019-10-03 PROCEDURE — 97110 THERAPEUTIC EXERCISES: CPT

## 2019-10-03 PROCEDURE — 97116 GAIT TRAINING THERAPY: CPT

## 2019-10-03 NOTE — PROGRESS NOTES
Daily Note     Today's date: 10/3/2019  Patient name: Mariana Arreguin  : 1970  MRN: [id number]  Referring provider: Joce Ross MD  Dx:   Encounter Diagnosis     ICD-10-CM    1  Fracture of posterior malleolus, left, open type III, with routine healing, subsequent encounter S82 392F    2  Status post open reduction with internal fixation (ORIF) of fracture of ankle Z98 890     Z87 81        Start Time: 1300  Stop Time: 1410  Total time in clinic (min): 70 minutes    Subjective: Pt arrives to the clinic using her walker and in her walking boot  Objective: See treatment diary below      Assessment: Tolerated treatment well  Patient exhibited good technique with therapeutic exercises  She continues to guard with sneaker walking  Plan: Continue per plan of care        Manual   10/3  9/25  9/12  9/16 9/30   STM  5  5  5  5 5   AAROM  5  5  5  5 5   MT  5  5  5  5 5                                   Exercise Diary  10/3  9/25  9/12 9/16 9/30   Ankle pumps 1 5# 30x  1 5# 30x  30X  #1  30x 1# #1 x 30   Ankle IV/EV 1 5# 30x  1 5# 30x  30 X / 30X #1  30x 30 1# #1 x 30                Towel stretch knee straight  5x 20"  5X 20"  5 X 20'  5x 20" 5 x 20"   Towel stretch knee bent  5x 20"  5X 20"  5 X 20'  5x 20" 5 x 20"    T-band DF  L3 3/10  L3 3/10  LV2 3/10  L2 3/10 L3 x 30   T-band PF  L3 3/10  L3 3/10  LV2 3/10  L2 3/10 L3 x 30    T-band EV/IV  L3 2/10  L3 3/10  LV2 3/10  L2 3/10 L3 x 30    SAQ  4# 3/10  3# 3/10  3# 3/10  3# 3/10 #4 x 30    LAQ  4# 3/10  3# 3/10  3# 3/10  3# 3/10 #4 x 30    Nu-step  8m L1  5m L1        Standing toe ups 2/5    X 5   Standing mini squats 2/5    X 5   Gait              weight shift 5m  5m    -->      walker/sneaker  5m  5m   --> 5 min         Modalities 10/3  9/23  9/25  9/16  9/30   Cold pack Declined   10 10 10 Declined

## 2019-10-07 ENCOUNTER — OFFICE VISIT (OUTPATIENT)
Dept: PHYSICAL THERAPY | Facility: CLINIC | Age: 49
End: 2019-10-07
Payer: COMMERCIAL

## 2019-10-07 ENCOUNTER — TRANSCRIBE ORDERS (OUTPATIENT)
Dept: PHYSICAL THERAPY | Facility: CLINIC | Age: 49
End: 2019-10-07

## 2019-10-07 DIAGNOSIS — Z87.81 STATUS POST OPEN REDUCTION WITH INTERNAL FIXATION (ORIF) OF FRACTURE OF ANKLE: ICD-10-CM

## 2019-10-07 DIAGNOSIS — Z98.890 STATUS POST OPEN REDUCTION WITH INTERNAL FIXATION (ORIF) OF FRACTURE OF ANKLE: ICD-10-CM

## 2019-10-07 DIAGNOSIS — S82.392F FRACTURE OF POSTERIOR MALLEOLUS, LEFT, OPEN TYPE III, WITH ROUTINE HEALING, SUBSEQUENT ENCOUNTER: Primary | ICD-10-CM

## 2019-10-07 PROCEDURE — 97116 GAIT TRAINING THERAPY: CPT

## 2019-10-07 PROCEDURE — 97110 THERAPEUTIC EXERCISES: CPT

## 2019-10-07 PROCEDURE — 97140 MANUAL THERAPY 1/> REGIONS: CPT

## 2019-10-07 NOTE — PROGRESS NOTES
Daily Note     Today's date: 10/7/2019  Patient name: Faid Jackson  : 1970  MRN: [id number]  Referring provider: Laquita Larson MD  Dx:   Encounter Diagnosis     ICD-10-CM    1  Fracture of posterior malleolus, left, open type III, with routine healing, subsequent encounter S82 392F    2  Status post open reduction with internal fixation (ORIF) of fracture of ankle Z98 890     Z87 81                   Subjective: Pt reports her ankle is feeling better today  She will see M/D Oct 29th  Objective: See treatment diary below      Assessment: Tolerated treatment well today  Pt responding well to program  Pt ambulates with shoes around house  Plan: Continue per plan of care        Manual   10/3  10/7  9/12  9/16 9/30   STM  5  5  5  5 5   AAROM  5  5  5  5 5   MT  5  5  5  5 5                                   Exercise Diary  10/3  10/7  9/12 9/16 9/30   Ankle pumps 1 5# 30x  1 5# 30x  30X  #1  30x 1# #1 x 30   Ankle IV/EV 1 5# 30x  1 5# 30x  30 X / 30X #1  30x 30 1# #1 x 30                Towel stretch knee straight  5x 20"  5X 20"  5 X 20'  5x 20" 5 x 20"   Towel stretch knee bent  5x 20"  5X 20"  5 X 20'  5x 20" 5 x 20"    T-band DF  L3 3/10  L3 3/10  LV2 3/10  L2 3/10 L3 x 30   T-band PF  L3 3/10  L3 3/10  LV2 3/10  L2 3/10 L3 x 30    T-band EV/IV  L3 2/10  L3 3/10  LV2 3/10  L2 3/10 L3 x 30    SAQ  4# 3/10  4# 3/10  3# 3/10  3# 3/10 #4 x 30    LAQ  4# 3/10  4# 3/10  3# 3/10  3# 3/10 #4 x 30    Nu-step  8m L1  6m L1        Standing toe ups 2/5 2/5   X 5   Standing mini squats 2/5 2/5   X 5   Gait              weight shift 5m  5m    -->      walker/sneaker  5m  5m   --> 5 min         Modalities 10/3  10/7  9/25  9/16  9/30   Cold pack Declined   10 10 10 Declined

## 2019-10-09 ENCOUNTER — OFFICE VISIT (OUTPATIENT)
Dept: PHYSICAL THERAPY | Facility: CLINIC | Age: 49
End: 2019-10-09
Payer: COMMERCIAL

## 2019-10-09 DIAGNOSIS — S82.392F FRACTURE OF POSTERIOR MALLEOLUS, LEFT, OPEN TYPE III, WITH ROUTINE HEALING, SUBSEQUENT ENCOUNTER: Primary | ICD-10-CM

## 2019-10-09 DIAGNOSIS — Z87.81 STATUS POST OPEN REDUCTION WITH INTERNAL FIXATION (ORIF) OF FRACTURE OF ANKLE: ICD-10-CM

## 2019-10-09 DIAGNOSIS — Z98.890 STATUS POST OPEN REDUCTION WITH INTERNAL FIXATION (ORIF) OF FRACTURE OF ANKLE: ICD-10-CM

## 2019-10-09 PROCEDURE — 97116 GAIT TRAINING THERAPY: CPT

## 2019-10-09 PROCEDURE — 97140 MANUAL THERAPY 1/> REGIONS: CPT

## 2019-10-09 PROCEDURE — 97110 THERAPEUTIC EXERCISES: CPT

## 2019-10-09 NOTE — PROGRESS NOTES
Daily Note     Today's date: 10/9/2019  Patient name: Alexandr Washburn  : 1970  MRN: [id number]  Referring provider: Ayana Baker MD  Dx:   Encounter Diagnosis     ICD-10-CM    1  Fracture of posterior malleolus, left, open type III, with routine healing, subsequent encounter S82 392F    2  Status post open reduction with internal fixation (ORIF) of fracture of ankle Z98 890     Z87 81        Start Time: 1300  Stop Time: 1410  Total time in clinic (min): 70 minutes    Subjective:  Pt reports she is walking better; she states she doesnt      Objective: See treatment diary below      Assessment: Tolerated treatment fair  Patient would benefit from continued PT      Plan: Continue per plan of care        Manual   10/3  10/7  10/9  9/16 9/30   STM  5  5  5  5 5   AAROM  5  5  5  5 5   MT  5  5  5  5 5                                   Exercise Diary  10/3  10/7  10/9 9/16 9/30   Ankle pumps 1 5# 30x  1 5# 30x  2# 30x  30x 1# #1 x 30   Ankle IV/EV 1 5# 30x  1 5# 30x  2# 30xea  30x 30 1# #1 x 30                Towel stretch knee straight  5x 20"  5X 20"  5 x20'  5x 20" 5 x 20"   Towel stretch knee bent  5x 20"  5X 20"  5 X 20'  5x 20" 5 x 20"    T-band DF  L3 3/10  L3 3/10  L3 3/10  L2 3/10 L3 x 30   T-band PF  L3 3/10  L3 3/10  L3 3/10  L2 3/10 L3 x 30    T-band EV/IV  L3 2/10  L3 3/10  L 3/10  L2 3/10 L3 x 30    SAQ  4# 3/10  4# 3/10  5# 3/10  3# 3/10 #4 x 30    LAQ  4# 3/10  4# 3/10  5# 3/10  3# 3/10 #4 x 30    Nu-step  8m L1  6m L1  12m L1      Standing toe ups 2/5 2/5 2/5  X 5   Standing mini squats 2/5 2/5 2/5  X 5   Gait              weight shift 5m  5m  5m  -->      walker/sneaker  5m  5m 5m  --> 5 min         Modalities 10/3  10/7 10/9  9/16  9/30   Cold pack Declined   10 deferr 10 Declined

## 2019-10-14 ENCOUNTER — OFFICE VISIT (OUTPATIENT)
Dept: PHYSICAL THERAPY | Facility: CLINIC | Age: 49
End: 2019-10-14
Payer: COMMERCIAL

## 2019-10-14 DIAGNOSIS — Z98.890 STATUS POST OPEN REDUCTION WITH INTERNAL FIXATION (ORIF) OF FRACTURE OF ANKLE: ICD-10-CM

## 2019-10-14 DIAGNOSIS — Z87.81 STATUS POST OPEN REDUCTION WITH INTERNAL FIXATION (ORIF) OF FRACTURE OF ANKLE: ICD-10-CM

## 2019-10-14 DIAGNOSIS — S82.392F FRACTURE OF POSTERIOR MALLEOLUS, LEFT, OPEN TYPE III, WITH ROUTINE HEALING, SUBSEQUENT ENCOUNTER: Primary | ICD-10-CM

## 2019-10-14 PROCEDURE — 97110 THERAPEUTIC EXERCISES: CPT

## 2019-10-14 PROCEDURE — 97116 GAIT TRAINING THERAPY: CPT

## 2019-10-14 PROCEDURE — 97140 MANUAL THERAPY 1/> REGIONS: CPT

## 2019-10-14 NOTE — PROGRESS NOTES
Daily Note     Today's date: 10/14/2019  Patient name: Fadi Jackson  : 1970  MRN: [id number]  Referring provider: Laquita Larson MD  Dx:   Encounter Diagnosis     ICD-10-CM    1  Fracture of posterior malleolus, left, open type III, with routine healing, subsequent encounter S82 392F    2  Status post open reduction with internal fixation (ORIF) of fracture of ankle Z98 890     Z87 81        Start Time: 1300  Stop Time: 1400  Total time in clinic (min): 60 minutes    Subjective: Patient reported "some soreness" in ankle/foot this session  Objective: PTA directed patient in LE strengthening and ROM exercise program, See treatment diary below  Ice applied post treat  Assessment: Tolerated treatment well  Patient able to ambulate in sneaker with improved heel strike/toe off, minimal soreness noted post        Plan: Continue per plan of care        Manual   10/3  10/7  10/9 10/14 9/30   STM  5  5  5  5 5   AAROM  5  5  5  5 5   MT  5  5  5  5 5                                   Exercise Diary  10/3  10/7  10/9 10/14 9/30   Ankle pumps 1 5# 30x  1 5# 30x  2# 30x  30x 2# #1 x 30   Ankle IV/EV 1 5# 30x  1 5# 30x  2# 30xea  30x 30 2# #1 x 30                Towel stretch knee straight  5x 20"  5X 20"  5 x20'  5x 20" 5 x 20"   Towel stretch knee bent  5x 20"  5X 20"  5 X 20' 5x 20" 5 x 20"    T-band DF  L3 3/10  L3 3/10  L3 3/10 L3 3/10 L3 x 30   T-band PF  L3 3/10  L3 3/10  L3 3/10  L3 3/10 L3 x 30    T-band EV/IV  L3 2/10  L3 3/10  L 3/10  L3 3/10 L3 x 30    SAQ  4# 3/10  4# 3/10  5# 3/10  5# 3/10 #4 x 30    LAQ  4# 3/10  4# 3/10  5# 3/10  5# 3/10 #4 x 30    Nu-step  8m L1  6m L1  12m L1  -->    Standing toe ups 2/5 2/5 2/5 2/10 X 5   Standing mini squats 2/5 2/5 2/5 2/5 X 5   Gait              weight shift 5m  5m  5m       walker/sneaker  5m  5m 5m  5min  5 min         Modalities 10/3  10/7 10/9 10/14  9/30   Cold pack Declined   10 deferr 8 Declined

## 2019-10-17 ENCOUNTER — OFFICE VISIT (OUTPATIENT)
Dept: PHYSICAL THERAPY | Facility: CLINIC | Age: 49
End: 2019-10-17
Payer: COMMERCIAL

## 2019-10-17 DIAGNOSIS — S82.392F FRACTURE OF POSTERIOR MALLEOLUS, LEFT, OPEN TYPE III, WITH ROUTINE HEALING, SUBSEQUENT ENCOUNTER: Primary | ICD-10-CM

## 2019-10-17 DIAGNOSIS — Z87.81 STATUS POST OPEN REDUCTION WITH INTERNAL FIXATION (ORIF) OF FRACTURE OF ANKLE: ICD-10-CM

## 2019-10-17 DIAGNOSIS — Z98.890 STATUS POST OPEN REDUCTION WITH INTERNAL FIXATION (ORIF) OF FRACTURE OF ANKLE: ICD-10-CM

## 2019-10-17 PROCEDURE — 97140 MANUAL THERAPY 1/> REGIONS: CPT

## 2019-10-17 PROCEDURE — 97116 GAIT TRAINING THERAPY: CPT

## 2019-10-17 PROCEDURE — 97110 THERAPEUTIC EXERCISES: CPT

## 2019-10-17 PROCEDURE — 97010 HOT OR COLD PACKS THERAPY: CPT

## 2019-10-17 NOTE — PROGRESS NOTES
Daily Note     Today's date: 10/17/2019  Patient name: Ahmet Schmitz  : 1970  MRN: [id number]  Referring provider: Arturo Huston MD  Dx:   Encounter Diagnosis     ICD-10-CM    1  Fracture of posterior malleolus, left, open type III, with routine healing, subsequent encounter S82 392F    2  Status post open reduction with internal fixation (ORIF) of fracture of ankle Z98 890     Z87 81        Start Time: 1300  Stop Time: 1410  Total time in clinic (min): 70 minutes    Subjective: Pt comments on increased stiffness with the cold/rainy weather  Objective: See treatment diary below  HPx10m pre exercise  Assessment: Tolerated treatment well  Patient would benefit from continued PT      Plan: Continue per plan of care        Manual   10/3  10/7  10/9 10/14 9/30   STM  5  5  5  5 5   AAROM  5  5  5  5 5   MT  5  5  5  5 5                                   Exercise Diary  10/3  10/7  10/9 10/14 9/30   Ankle pumps 1 5# 30x  1 5# 30x  2# 30x  30x 2# #1 x 30   Ankle IV/EV 1 5# 30x  1 5# 30x  2# 30xea  30x 30 2# #1 x 30                Towel stretch knee straight  5x 20"  5X 20"  5 x20'  5x 20" 5 x 20"   Towel stretch knee bent  5x 20"  5X 20"  5 X 20' 5x 20" 5 x 20"    T-band DF  L3 3/10  L3 3/10  L3 3/10 L3 3/10 L3 x 30   T-band PF  L3 3/10  L3 3/10  L3 3/10  L3 3/10 L3 x 30    T-band EV/IV  L3 2/10  L3 3/10  L 3/10  L3 3/10 L3 x 30    SAQ  4# 3/10  4# 3/10  5# 3/10  5# 3/10 #4 x 30    LAQ  4# 3/10  4# 3/10  5# 3/10  5# 3/10 #4 x 30    Nu-step  8m L1  6m L1  12m L1  -->    Standing toe ups 2/5 2/5 2/5 2/10 X 5   Standing mini squats 2/5 2/5 2/5 2/5 X 5   Gait              weight shift 5m  5m  5m       walker/sneaker  5m  5m 5m  5min  5 min         Modalities 10/3  10/7 10/9 10/14  9/30   Cold pack Declined   10 deferr 8 Declined                                 Daily Note     Today's date: 10/17/2019  Patient name: Ahmet Schmitz  : 1970  MRN: [id number]  Referring provider: Arturo Huston MD  Dx: Encounter Diagnosis     ICD-10-CM    1  Fracture of posterior malleolus, left, open type III, with routine healing, subsequent encounter S82 392F    2  Status post open reduction with internal fixation (ORIF) of fracture of ankle Z98 890     Z87 81        Start Time: 1300  Stop Time: 1410  Total time in clinic (min): 70 minutes    Subjective: Pt comments on increased soreness with the cold/rainy weather  Objective: See treatment diary below  HP pre exercise      Assessment: Tolerated treatment fair  Patient demonstrated fatigue post treatment      Plan: Continue per plan of care        Manual   10/3  10/7  10/9 10/14 10/17   STM  5  5  5  5 5   AAROM  5  5  5  5 5   MT  5  5  5  5 5                                   Exercise Diary  10/3  10/7  10/9 10/14 10/17   Ankle pumps 1 5# 30x  1 5# 30x  2# 30x  30x 2# #2 x 30   Ankle IV/EV 1 5# 30x  1 5# 30x  2# 30xea  30x 30 2# #1 x 30                Towel stretch knee straight  5x 20"  5X 20"  5 x20'  5x 20" 5 x 20"   Towel stretch knee bent  5x 20"  5X 20"  5 X 20' 5x 20" 5 x 20"    T-band DF  L3 3/10  L3 3/10  L3 3/10 L3 3/10 L3 x 30   T-band PF  L3 3/10  L3 3/10  L3 3/10  L3 3/10 L3 x 30    T-band EV/IV  L3 2/10  L3 3/10  L 3/10  L3 3/10 L3 x 30    SAQ  4# 3/10  4# 3/10  5# 3/10  5# 3/10 #5 x 30    LAQ  4# 3/10  4# 3/10  5# 3/10  5# 3/10 #5 x 30    Nu-step  8m L1  6m L1  12m L1  --> 12m L1   Standing toe ups 2/5 2/5 2/5 2/10 2/10   Standing mini squats 2/5 2/5 2/5 2/5 2/5   Gait              weight shift 5m  5m  5m  ------>     walker/sneaker  5m  5m 5m  5min  10 m         Modalities 10/3  10/7 10/9 10/14 10/17   Cold pack Declined   10 deferr 8 Declined

## 2019-10-22 ENCOUNTER — OFFICE VISIT (OUTPATIENT)
Dept: PHYSICAL THERAPY | Facility: CLINIC | Age: 49
End: 2019-10-22
Payer: COMMERCIAL

## 2019-10-22 DIAGNOSIS — S82.392F FRACTURE OF POSTERIOR MALLEOLUS, LEFT, OPEN TYPE III, WITH ROUTINE HEALING, SUBSEQUENT ENCOUNTER: Primary | ICD-10-CM

## 2019-10-22 DIAGNOSIS — Z98.890 STATUS POST OPEN REDUCTION WITH INTERNAL FIXATION (ORIF) OF FRACTURE OF ANKLE: ICD-10-CM

## 2019-10-22 DIAGNOSIS — Z87.81 STATUS POST OPEN REDUCTION WITH INTERNAL FIXATION (ORIF) OF FRACTURE OF ANKLE: ICD-10-CM

## 2019-10-22 PROCEDURE — 97140 MANUAL THERAPY 1/> REGIONS: CPT

## 2019-10-22 PROCEDURE — 97116 GAIT TRAINING THERAPY: CPT

## 2019-10-22 PROCEDURE — 97110 THERAPEUTIC EXERCISES: CPT

## 2019-10-22 NOTE — PROGRESS NOTES
Daily Note     Today's date: 10/22/2019  Patient name: Carolyn Pro  : 1970  MRN: [id number]  Referring provider: Celine Ureña MD  Dx:   Encounter Diagnosis     ICD-10-CM    1  Fracture of posterior malleolus, left, open type III, with routine healing, subsequent encounter S82 392F    2  Status post open reduction with internal fixation (ORIF) of fracture of ankle Z98 890     Z87 81        Start Time: 1330  Stop Time: 1430  Total time in clinic (min): 60 minutes    Subjective: Patient continues to ambulate at home with just use of sneaker ; boot is worn when outside as patient does not feel confident  She returns to MD on 10/29/19  Objective: PTA directed patient in LE strengthening and ROM exercise program, increasing intensity with TE, See treatment diary below  Assessment: Tolerated treatment well  Patient able to ambulate with shoe only, no AD in PT clinic roughly 150 ft, CG  Minimal discomfort in L medial foot  Verbal cueing required for heel strike and decreasing gait stride using parallel bars  Plan: Continue per plan of care        Manual   10/3  10/7  10/9 10/14 10/22   STM  5  5  5  5 5   AAROM  5  5  5  5 5   MT  5  5  5  5 5                                   Exercise Diary  10/3  10/7  10/9 10/14 10/22   Ankle pumps 1 5# 30x  1 5# 30x  2# 30x  30x 2# 3#  x 30   Ankle IV/EV 1 5# 30x  1 5# 30x  2# 30xea  30x 30 2# 3# x30                Towel stretch knee straight  5x 20"  5X 20"  5 x20'  5x 20" 5 x 20"   Towel stretch knee bent  5x 20"  5X 20"  5 X 20' 5x 20" 5 x 20"    T-band DF  L3 3/10  L3 3/10  L3 3/10 L3 3/10 L4 2/20   T-band PF  L3 3/10  L3 3/10  L3 3/10  L3 3/10 L4 2/20    T-band EV/IV  L3 2/10  L3 3/10  L 3/10  L3 3/10 L4 2/20    SAQ  4# 3/10  4# 3/10  5# 3/10  5# 3/10 5#  3/10    LAQ  4# 3/10  4# 3/10  5# 3/10  5# 3/10 5# 3/10    Nu-step S8 A9  8m L1  6m L1  12m L1  --> 12m L2    Standing toe ups / 2/ 2/5 2/10 X 5   Standing mini squats // 2/ 2/5 X 5   Gait              weight shift 5m  5m  5m       walker/sneaker  5m  5m 5m  5min  5 min         Modalities 10/3  10/7 10/9 10/14 10/22   Cold pack Declined   10 deferr 8 Declined

## 2019-10-24 ENCOUNTER — OFFICE VISIT (OUTPATIENT)
Dept: PHYSICAL THERAPY | Facility: CLINIC | Age: 49
End: 2019-10-24
Payer: COMMERCIAL

## 2019-10-24 DIAGNOSIS — Z87.81 STATUS POST OPEN REDUCTION WITH INTERNAL FIXATION (ORIF) OF FRACTURE OF ANKLE: ICD-10-CM

## 2019-10-24 DIAGNOSIS — S82.392F FRACTURE OF POSTERIOR MALLEOLUS, LEFT, OPEN TYPE III, WITH ROUTINE HEALING, SUBSEQUENT ENCOUNTER: Primary | ICD-10-CM

## 2019-10-24 DIAGNOSIS — Z98.890 STATUS POST OPEN REDUCTION WITH INTERNAL FIXATION (ORIF) OF FRACTURE OF ANKLE: ICD-10-CM

## 2019-10-24 PROCEDURE — 97110 THERAPEUTIC EXERCISES: CPT

## 2019-10-24 PROCEDURE — 97140 MANUAL THERAPY 1/> REGIONS: CPT

## 2019-10-24 NOTE — PROGRESS NOTES
Daily Note     Today's date: 10/24/2019  Patient name: Addy Perez  : 1970  MRN: [id number]  Referring provider: Alejandrina Weldon MD  Dx:   Encounter Diagnosis     ICD-10-CM    1  Fracture of posterior malleolus, left, open type III, with routine healing, subsequent encounter S82 392F    2  Status post open reduction with internal fixation (ORIF) of fracture of ankle Z98 890     Z87 [id number]        Start Time: 1302          Subjective: Pain level 4/10  Now walking in boot but without walker      Objective: See treatment diary below      Assessment: Tolerated treatment well and gait remains antalgic with decreased WB on left  Patient demonstrated fatigue post treatment and would benefit from continued PT      Plan: Continue per plan of care  Progress treatment as tolerated         Manual   10/7  10/9 10/14 10/22 10/24   STM  5  5  5 5 5   AAROM  5  5  5 5 5   MT  5  5  5 5 5                                 Exercise Diary   10/7  10/9 10/14 10/22 10/24   Ankle pumps  1 5# 30x  2# 30x  30x 2# 3#  x 30 #4 x 30   Ankle IV/EV  1 5# 30x  2# 30xea  30x 30 2# 3# x30 #4 x 30               Towel stretch knee straight  5X 20"  5 x20'  5x 20" 5 x 20" 5 x 20"   Towel stretch knee bent  5X 20"  5 X 20' 5x 20" 5 x 20" 5 x 20    T-band DF  L3 3/10  L3 3/10 L3 3/10 L4 2/20 Blue x 30   T-band PF  L3 3/10  L3 3/10  L3 3/10 L4 2/20 Blue x 30    T-band EV/IV  L3 3/10  L 3/10  L3 3/10 L4 2/20 Blue x 30    SAQ  4# 3/10  5# 3/10  5# 3/10 5#  3/10 #6 x 30    LAQ  4# 3/10  5# 3/10  5# 3/10 5# 3/10 #6 x 30    Nu-step S8 A9  6m L1  12m L1  --> 12m L2     Standing toe ups 2/5 2/5 2/10 X 5 X 30   Standing mini squats 2/5 2/5 2/5 X 5 X 10   Gait             weight shift  5m  5m        walker/sneaker  5m 5m  5min  5 min x5         Modalities 10/3  10/7 10/9 10/14 10/22   Cold pack Declined   10 deferr 8 Declined Liliam Garrido is an 89 year old female.     Patient presented in the emergency room with generalized weakness, the patient is usually in [place] with walker for several days that she was feeling very lethargic and would not want to come out of the bed, the patient was complaining of cough and cold and she was seen in the emergency room but she was diagnosed as acute bronchitis and completed the course of antibiotics, the patient also has an episode of diarrhea at the nursing home, after that she developed increased lethargy, the patient in the ER found to be dehydrated with acute renal failure patient is admitted for further avulsion treatment    ROS:  Denies any headaches, denies any fever or denies any chills complains of body aches and pains denies any shortness of breath denies any abnormal cough denies any abdominal pain denies any diarrhea denies any constant patient denies any bleeding from any part of the body    Physical Exam:  Blood pressure 168/86, pulse 64, temperature 98 °F (36.7 °C), temperature source Oral, resp. rate 16, height 5' 4\" (1.626 m), weight 50 kg, SpO2 94 %.    HEENT: Throat is clear, there is no exudate                 Neck is supple there is no adenopathy there is no JVD                 Eyes: Clear and nonicteric there is no congestion    Normal breath sounds. No respiratory distress. No wheezing. No rales. No rhonchi. No chest tenderness.        CVS: Normal S1 and S2 sound there is no S3 and no S4 no additional sound  Normal abdomen exam. Bowel sounds normal. Soft. No tenderness. No masses. No pulsatile masses. No rebound or organomegaly.        CNS : Alert and oriented,, cranial nerves 2-12 are grossly intact, DTRs reflexes are normal            Sensations are normal            Cerebellar tests are grossly intact, Babiniski sign is negative    Skin: Showing a rash and irritation under the breast      Past Medical History:   Diagnosis Date   • Anxiety    • Arthritis    • CAD  (coronary artery disease)    • Depression    • Essential hypertension, benign    • Heart disease, unspecified    • HTN (hypertension)    • Personal history of arthritis    • Pneumonia    • PONV (postoperative nausea and vomiting)    • Urinary incontinence      Past Surgical History:   Procedure Laterality Date   • Abdomen surgery     • Appendectomy     • Cardiac catherization     • Cdl ptca w/ stent  11/08/2005    2.25x18 Pixel stent in distal LAD.  3.0x12 Taxus stent in prox LAD.   • Cdl ptca w/ stent  06/27/2007    3.0x20 Taxus stent in ostium of OM branch of circ.   • Eye surgery Bilateral 2012    cataract removed   • Hernia repair     • Hysterectomy     • Joint replacement     • Removal gallbladder     • Repair rotator cuff,acute      right   • Stress echocardiogram  10/25/2012    Normal Dobutamine   • Total knee replacement      right knee   • Vascular surgery       Family History   Problem Relation Age of Onset   • Heart disease Mother    • Heart disease Father    • Cancer Brother         colon   • Heart disease Brother    • Osteoarthritis Sister    • Cancer Sister      Social History     Tobacco Use   • Smoking status: Never Smoker   • Smokeless tobacco: Never Used   Substance Use Topics   • Alcohol use: No       Prior to Admission Meds:  Medications Prior to Admission   Medication Sig Dispense Refill   • Probiotic Product (FLORAJEN3) capsule Take 1 capsule by mouth 2 times daily.     • miconazole (REMEDY ANTIFUNGAL) 2 % topical powder Apply 1 application topically 2 times daily.     • potassium chloride 20 MEQ/15ML (10%) solution Take 20 mEq by mouth nightly.     • acetaminophen (TYLENOL) 325 MG tablet Take 650 mg by mouth every 4 hours as needed for Pain or Fever. 2 tabs (=650mg)     • doxycycline hyclate (VIBRA-TABS) 100 MG tablet Take 1 tablet by mouth 2 times daily. 28 tablet 0   • albuterol 108 (90 Base) MCG/ACT inhaler Inhale 2 puffs into the lungs every 4 hours as needed for Wheezing. 8.5 g 0   •  furosemide (LASIX) 20 MG tablet Take 1 tablet by mouth daily. 30 tablet 0   • isosorbide mononitrate (IMDUR) 30 MG 24 hr tablet Take 1 tablet by mouth daily. 30 tablet 0   • acetaminophen (TYLENOL) 325 MG tablet Take 650 mg by mouth at bedtime.     • escitalopram (LEXAPRO) 10 MG tablet Take 10 mg by mouth nightly.      • LORazepam (ATIVAN) 0.5 MG tablet Take 0.5 mg by mouth at bedtime.     • atorvastatin (LIPITOR) 20 MG tablet Take 1 tablet by mouth daily. 90 tablet 3   • Aspirin (BABY ASPIRIN) 81 MG OR CHEW Take 1 tablet by mouth daily. 0    • bisacodyl (DULCOLAX) 10 MG suppository Place 10 mg rectally daily as needed for Constipation.           Allergies:   ALLERGIES:   Allergen Reactions   • Pentothal [Thiopental Sodium] DIARRHEA and Nausea & Vomiting     Malaise    • Sulfa Antibiotics VOMITING     6/29/2009 Pt stated she was prescribed too much  Medication.    • Codeine Phosphate VOMITING, NAUSEA and DIARRHEA   • Desipramine INSOMNIA   • Diovan [Valsartan] DIZZINESS and NAUSEA       Active Problems:    * No active hospital problems. *      Results for orders placed or performed during the hospital encounter of 01/24/19   CBC & Auto Differential   Result Value    WBC 8.4    RBC 5.02    HGB 16.2 (H)    HCT 47.7 (H)    MCV 95.0    MCH 32.3    MCHC 34.0    RDW-CV 12.9        NRBC 0    DIFF TYPE AUTOMATED DIFFERENTIAL    Neutrophil 74    LYMPH 18    MONO 7    EOSIN 0    BASO 1    Percent Immature Granuloctyes 0    Absolute Neutrophil 6.3    Absolute Lymph 1.5    Absolute Mono 0.6    Absolute Eos 0.0 (L)    Absolute Baso 0.1    Absolute Immature Granulocytes 0.0   Prothrombin Time   Result Value    PROTIME 12.5 (H)    INR 1.2     Comment: INR Therapeutic Range: 2.0 to 3.0 (2.5 to 3.5 recommended for recurrent thrombotic episodes and mechanical prosthetic heart valves.)    Comprehensive Metabolic Panel   Result Value    Sodium 140    Potassium 3.7    Chloride 101    Carbon Dioxide 27    Anion Gap 16    Glucose  120 (H)    BUN 47 (H)    Creatinine 1.36 (H)    GFR Estimate,  38     Comment: eGFR 30-59 mL/min/1.73m2 = Moderate decrease in kidney function.  Stage 3 CKD (chronic kidney disease) or moderate kidney disease.  The CKD-EPI equation has not been validated in patients >93 years of age and the clinician must determine suitability of   the value for the patient.      GFR Estimate, Non  33     Comment: eGFR 30-59 mL/min/1.73m2 = Moderate decrease in kidney function.  Stage 3 CKD (chronic kidney disease) or moderate kidney disease.  The CKD-EPI equation has not been validated in patients >93 years of age and the clinician must determine suitability of   the value for the patient.      BUN/Creatinine Ratio 35 (H)    CALCIUM 10.3 (H)    TOTAL BILIRUBIN 2.0 (H)    AST/SGOT 30    ALT/SGPT 17    ALK PHOSPHATASE 131 (H)    TOTAL PROTEIN 8.1    Albumin 3.6    GLOBULIN 4.5 (H)    A/G Ratio, Serum 0.8 (L)   Magnesium Level   Result Value    MAGNESIUM 1.7   Urinalysis with Micro & Culture if Indicated   Result Value    COLOR YELLOW    APPEARANCE CLEAR    GLUCOSE(URINE) NEGATIVE    BILIRUBIN NEGATIVE    KETONES NEGATIVE    SPECIFIC GRAVITY 1.016    BLOOD NEGATIVE    pH 5.5    PROTEIN(URINE) NEGATIVE    UROBILINOGEN 1.0    NITRITE NEGATIVE    LEUKOCYTE ESTERASE NEGATIVE    Squamous EPI'S NONE SEEN    RBC 1 to 3    WBC NONE SEEN    BACTERIA NONE SEEN    Hyaline Casts NONE SEEN    SPECIMEN TYPE URINE, CLEAN CATCH/MIDSTREAM   NT proBNP   Result Value    NT proBNP 485 (H)   Chem 8 Panel - Point of Care   Result Value    Sodium     Potassium POC 3.6    Chloride     CALCIUM IONIZED-POC 1.17    CO2 Total 27 (H)    GLUCOSE  (H)     Comment: Reference range is for a fasting sample.    BUN POC 43 (H)    HEMATOCRIT POC 52.0 (H)    Hemoglobin POC 17.7 (H)    ANION GAP POC 17    Creatinine POC 1.50 (H)    Estimated GFR  (POC) 34     Comment: eGFR 30-59 mL/min/1.73m2 = Moderate  decrease in kidney function.  Stage 3 CKD (chronic kidney disease) or moderate kidney disease.  The CKD-EPI equation has not been validated in patients >93 years of age and the clinician must determine suitability of   the value for the patient.      Estimated GFR Non- (POC) 29     Comment: eGFR 15 - 29 mL/min/1.73m2  = Severe decrease in kidney function.  Stage 4 CKD (chronic kidney disease), or severe kidney disease. The CKD-EPI equation has not been validated in patients >93 years of age and the clinician must determine suitability of   the value for the patient.     Troponin I - Point of Care   Result Value    Troponin I POC <0.10   CBC & Auto Differential   Result Value    WBC 7.8    RBC 4.35    HGB 13.8    HCT 41.2    MCV 94.7    MCH 31.7    MCHC 33.5    RDW-CV 12.7        NRBC 0    DIFF TYPE AUTOMATED DIFFERENTIAL    Neutrophil 59    LYMPH 29    MONO 10    EOSIN 1    BASO 1    Percent Immature Granuloctyes 0    Absolute Neutrophil 4.7    Absolute Lymph 2.2    Absolute Mono 0.8    Absolute Eos 0.1    Absolute Baso 0.1    Absolute Immature Granulocytes 0.0   Comprehensive Metabolic Panel   Result Value    Sodium 141    Potassium 3.2 (L)    Chloride 105    Carbon Dioxide 27    Anion Gap 12    Glucose 91    BUN 43 (H)    Creatinine 1.31 (H)    GFR Estimate,  40     Comment: eGFR 30-59 mL/min/1.73m2 = Moderate decrease in kidney function.  Stage 3 CKD (chronic kidney disease) or moderate kidney disease.  The CKD-EPI equation has not been validated in patients >93 years of age and the clinician must determine suitability of   the value for the patient.      GFR Estimate, Non  35     Comment: eGFR 30-59 mL/min/1.73m2 = Moderate decrease in kidney function.  Stage 3 CKD (chronic kidney disease) or moderate kidney disease.  The CKD-EPI equation has not been validated in patients >93 years of age and the clinician must determine suitability of   the value for the  patient.      BUN/Creatinine Ratio 33 (H)    CALCIUM 8.9    TOTAL BILIRUBIN 1.6 (H)    AST/SGOT 25    ALT/SGPT 15    ALK PHOSPHATASE 103    TOTAL PROTEIN 6.4    Albumin 2.8 (L)    GLOBULIN 3.6    A/G Ratio, Serum 0.8 (L)   ECG   Result Value    Systolic Blood Pressure 162    Diastolic Blood Pressure 97    Ventricular Rate EKG/Min (BPM) 95    Atrial Rate (BPM) 95    MD-Interval (MSEC) 238    QRS-Interval (MSEC) 68    QT-Interval (MSEC) 378    QTc 475    P Axis (Degrees) 74    R Axis (Degrees) -18    T Axis (Degrees) -7    REPORT TEXT      Sinus rhythm  with 1st degree AV block  with  premature atrial complexes  Low voltage QRS  Inferior infarct  (cited on or before  25-OCT-2012)  Abnormal ECG  When compared with ECG of  14-JAN-2019 10:42,  premature atrial complexes  are now  present  Vent. rate  has increased  BY  31 BPM  Criteria for  Anterior infarct  are no longer  present  Confirmed by KIMBERLY MASTERSON, BILLY DUNLAP ([id number]) on 1/24/2019 10:46:28 PM           Assessment:  Dehydration  Acute diarrhea  Acute bronchitis  Lethargy  Acute renal failure due to dehydration  Hypertension  Coronary artery disease  Plan:  Hold Lasix  IV hydration  Electrolyte supplementation    Kamini Dumont MD  1/25/2019   >70 min for review of charts,discusssion with consultants, patient/family review of lab and imaging and medication reconciliation and planning and references and documentation

## 2019-10-28 ENCOUNTER — OFFICE VISIT (OUTPATIENT)
Dept: PHYSICAL THERAPY | Facility: CLINIC | Age: 49
End: 2019-10-28
Payer: COMMERCIAL

## 2019-10-28 DIAGNOSIS — Z87.81 STATUS POST OPEN REDUCTION WITH INTERNAL FIXATION (ORIF) OF FRACTURE OF ANKLE: ICD-10-CM

## 2019-10-28 DIAGNOSIS — Z98.890 STATUS POST OPEN REDUCTION WITH INTERNAL FIXATION (ORIF) OF FRACTURE OF ANKLE: ICD-10-CM

## 2019-10-28 DIAGNOSIS — S82.392F FRACTURE OF POSTERIOR MALLEOLUS, LEFT, OPEN TYPE III, WITH ROUTINE HEALING, SUBSEQUENT ENCOUNTER: Primary | ICD-10-CM

## 2019-10-28 PROCEDURE — 97110 THERAPEUTIC EXERCISES: CPT

## 2019-10-28 PROCEDURE — 97140 MANUAL THERAPY 1/> REGIONS: CPT

## 2019-10-28 NOTE — PROGRESS NOTES
Daily Note     Today's date: 10/28/2019  Patient name: Samuel Torres  : 1970  MRN: [id number]  Referring provider: Miya Galvez MD  Dx:   Encounter Diagnosis     ICD-10-CM    1  Fracture of posterior malleolus, left, open type III, with routine healing, subsequent encounter S82 392F    2  Status post open reduction with internal fixation (ORIF) of fracture of ankle Z98 890     Z87 81        Start Time: 1400  Stop Time: 1440  Total time in clinic (min): 40 minutes    Subjective: Pt states she has an MD appt tomorrow  Objective: See treatment diary below  Assessment: Tolerated treatment fair  Patient demonstrated fatigue post treatment  Plan: Continue per plan of care        Manual   10/7  10/9 10/14 10/22 10/24   STM  5  5  5 5 5   AAROM  5  5  5 5 5   MT  5  5  5 5 5                                 Exercise Diary   10/28  10/9 10/14 10/22 10/24   Ankle pumps  1 5# 30x  2# 30x  30x 2# 3#  x 30 #4 x 30   Ankle IV/EV  1 5# 30x  2# 30xea  30x 30 2# 3# x30 #4 x 30               Towel stretch knee straight  5X 20"  5 x20'  5x 20" 5 x 20" 5 x 20"   Towel stretch knee bent  5X 20"  5 X 20' 5x 20" 5 x 20" 5 x 20    T-band DF  L3 3/10  L3 3/10 L3 3/10 L4 2/20 Blue x 30   T-band PF  L3 3/10  L3 3/10  L3 3/10 L4 2/20 Blue x 30    T-band EV/IV  L3 3/10  L 3/10  L3 3/10 L4 2/20 Blue x 30    SAQ  4# 3/10  5# 3/10  5# 3/10 5#  3/10 #6 x 30    LAQ  4# 3/10  5# 3/10  5# 3/10 5# 3/10 #6 x 30    Nu-step S8 A9  6m L1  12m L1  --> 12m L2     Standing toe ups 20x 2/5 2/10 X 5 X 30   Standing mini squats 2/10 2/5 2/5 X 5 X 10   Gait             walker/sneaker ----> 5m  5min  5 min x5         Modalities 10/28  10/7 10/9 10/14 10/22   Cold pack Declined   10 deferr 8 Declined

## 2019-10-30 ENCOUNTER — OFFICE VISIT (OUTPATIENT)
Dept: PHYSICAL THERAPY | Facility: CLINIC | Age: 49
End: 2019-10-30
Payer: COMMERCIAL

## 2019-10-30 DIAGNOSIS — S82.392F FRACTURE OF POSTERIOR MALLEOLUS, LEFT, OPEN TYPE III, WITH ROUTINE HEALING, SUBSEQUENT ENCOUNTER: Primary | ICD-10-CM

## 2019-10-30 DIAGNOSIS — Z98.890 STATUS POST OPEN REDUCTION WITH INTERNAL FIXATION (ORIF) OF FRACTURE OF ANKLE: ICD-10-CM

## 2019-10-30 DIAGNOSIS — Z87.81 STATUS POST OPEN REDUCTION WITH INTERNAL FIXATION (ORIF) OF FRACTURE OF ANKLE: ICD-10-CM

## 2019-10-30 PROCEDURE — 97110 THERAPEUTIC EXERCISES: CPT

## 2019-10-30 PROCEDURE — 97140 MANUAL THERAPY 1/> REGIONS: CPT

## 2019-10-30 NOTE — PROGRESS NOTES
Daily Note     Today's date: 10/30/2019  Patient name: Juice Cha  : 1970  MRN: [id number]  Referring provider: Lacy May MD  Dx:   Encounter Diagnosis     ICD-10-CM    1  Fracture of posterior malleolus, left, open type III, with routine healing, subsequent encounter S82 392F    2  Status post open reduction with internal fixation (ORIF) of fracture of ankle Z98 890     Z87 81        Start Time: 1415  Stop Time: 1520  Total time in clinic (min): 65 minutes    Subjective: Pt returns from her MD F/U stating he is pleased with her healing and would like to continue PT;  focusing on strentgh  and ambulation  Objective: See treatment diary below      Assessment: Tolerated treatment well  Patient exhibited good technique with therapeutic exercises except cw/ccw Baps      Plan: Continue per plan of care        Manual   10/28  10/30 10/14 10/22 10/24   STM  5    5 5 5   AAROM  5  10  5 5 5   MT  5    5 5 5                                 Exercise Diary   10/28  10/30 10/14 10/22 10/24   Ankle pumps  1 5# 30x  home  30x 2# 3#  x 30 #4 x 30   Ankle IV/EV  1 5# 30x  home  30x 30 2# 3# x30 #4 x 30               Towel stretch knee straight  5X 20"  5 x20'  5x 20" 5 x 20" 5 x 20"   Towel stretch knee bent  5X 20"  5 X 20' 5x 20" 5 x 20" 5 x 20    T-band DF  L3 3/10  L4 3/10 L3 3/10 L4 2/20 Blue x 30   T-band PF  L3 3/10  L4 3/10  L3 3/10 L4 2/20 Blue x 30    T-band EV/IV  L3 3/10  L4 3/10  L3 3/10 L4 2/20 Blue x 30    SAQ  4# 3/10  5# 3/10  5# 3/10 5#  3/10 #6 x 30    LAQ  4# 3/10  5# 3/10  5# 3/10 5# 3/10 #6 x 30    Nu-step S8 A9  6m L1  12m L3  --> 12m L2     Standing toe ups 20x 20x 2/10 X 5 X 30   Standing mini squats 2/10 -----> 2/5 X 5 X 10   Gait             walker/sneaker ----> 10m  5min  5 min x5   BAPS  L1 f/b,s/s  cw/ccw      LF ext  25# 2/10      LF curl  35#2/10      LF leg press  10# 15x      SLS  3x 20"                    Modalities 10/28  10/30 10/9 10/14 10/22   Cold pack Declined   declin deferr 8 Declined

## 2019-11-04 ENCOUNTER — OFFICE VISIT (OUTPATIENT)
Dept: PHYSICAL THERAPY | Facility: CLINIC | Age: 49
End: 2019-11-04
Payer: COMMERCIAL

## 2019-11-04 DIAGNOSIS — Z98.890 STATUS POST OPEN REDUCTION WITH INTERNAL FIXATION (ORIF) OF FRACTURE OF ANKLE: ICD-10-CM

## 2019-11-04 DIAGNOSIS — S82.392F FRACTURE OF POSTERIOR MALLEOLUS, LEFT, OPEN TYPE III, WITH ROUTINE HEALING, SUBSEQUENT ENCOUNTER: Primary | ICD-10-CM

## 2019-11-04 DIAGNOSIS — Z87.81 STATUS POST OPEN REDUCTION WITH INTERNAL FIXATION (ORIF) OF FRACTURE OF ANKLE: ICD-10-CM

## 2019-11-04 PROCEDURE — 97116 GAIT TRAINING THERAPY: CPT

## 2019-11-04 PROCEDURE — 97110 THERAPEUTIC EXERCISES: CPT

## 2019-11-04 NOTE — PROGRESS NOTES
Daily Note     Today's date: 2019  Patient name: Cinthya Macedo  : 1970  MRN: [id number]  Referring provider: Feliz Reed MD  Dx:   Encounter Diagnosis     ICD-10-CM    1  Fracture of posterior malleolus, left, open type III, with routine healing, subsequent encounter S82 392F    2  Status post open reduction with internal fixation (ORIF) of fracture of ankle Z98 890     Z87 81        Start Time: 1330  Stop Time: 1415  Total time in clinic (min): 45 minutes    Subjective: Pt requests shortened tx due another appt  Objective: See treatment diary below  Abbreviated tx today  Assessment: Tolerated treatment well  Patient would benefit from continued PT      Plan: Continue per plan of care        Manual   10/28  10/30 11/4 10/22 10/24   STM  5     5 5   AAROM  5  10  ------> 5 5   MT  5     5 5                                 Exercise Diary   10/28  10/30 11/4 10/22 10/24   Ankle pumps  1 5# 30x  home  home 3#  x 30 #4 x 30   Ankle IV/EV  1 5# 30x  home  home 3# x30 #4 x 30               Towel stretch knee straight  5X 20"  5 x20'  home 5 x 20" 5 x 20"   Towel stretch knee bent  5X 20"  5 X 20' home 5 x 20" 5 x 20    T-band DF  L3 3/10  L4 3/10 L4 3/10 L4 2/20 Blue x 30   T-band PF  L3 3/10  L4 3/10  L4 3/10 L4 2/20 Blue x 30    T-band EV/IV  L3 3/10  L4 3/10  L4 3/10 L4 2/20 Blue x 30    SAQ  4# 3/10 xxxxx  xxxxx 5#  3/10 #6 x 30    LAQ  4# 3/10  xxxxxx xxxxx 5# 3/10 #6 x 30    Nu-step S8 A9  6m L1  10m L3 10m L3 12m L2     Standing toe ups 20x 20x 2/10 X 5 X 30   Standing mini squats /10 -----> ---> X 5 X 10   Gait             walker/sneaker ----> 10m  10m 5 min x5   BAPS  L1 f/b,s/s  cw/ccw ------>     LF ext  25# 2/10 ----->     LF curl  35#2/10 ------>     LF leg press  10# 15x ------->     SLS  3x 20" -------->                   Modalities 10/28  10/30 11/4 10/14 10/22   Cold pack Declined   declin declin 8 Declined

## 2019-11-06 ENCOUNTER — OFFICE VISIT (OUTPATIENT)
Dept: PHYSICAL THERAPY | Facility: CLINIC | Age: 49
End: 2019-11-06
Payer: COMMERCIAL

## 2019-11-06 DIAGNOSIS — Z98.890 STATUS POST OPEN REDUCTION WITH INTERNAL FIXATION (ORIF) OF FRACTURE OF ANKLE: ICD-10-CM

## 2019-11-06 DIAGNOSIS — Z87.81 STATUS POST OPEN REDUCTION WITH INTERNAL FIXATION (ORIF) OF FRACTURE OF ANKLE: ICD-10-CM

## 2019-11-06 DIAGNOSIS — S82.392F FRACTURE OF POSTERIOR MALLEOLUS, LEFT, OPEN TYPE III, WITH ROUTINE HEALING, SUBSEQUENT ENCOUNTER: Primary | ICD-10-CM

## 2019-11-06 PROCEDURE — 97110 THERAPEUTIC EXERCISES: CPT

## 2019-11-06 PROCEDURE — 97010 HOT OR COLD PACKS THERAPY: CPT

## 2019-11-06 PROCEDURE — 97116 GAIT TRAINING THERAPY: CPT

## 2019-11-06 PROCEDURE — 97140 MANUAL THERAPY 1/> REGIONS: CPT

## 2019-11-06 NOTE — PROGRESS NOTES
Daily Note     Today's date: 2019  Patient name: Kelly Shah  : 1970  MRN: [id number]  Referring provider: Brittney Marino MD  Dx:   Encounter Diagnosis     ICD-10-CM    1  Fracture of posterior malleolus, left, open type III, with routine healing, subsequent encounter S82 392F    2  Status post open reduction with internal fixation (ORIF) of fracture of ankle Z98 890     Z87 81        Start Time: 1330  Stop Time: 1445  Total time in clinic (min): 75 minutes    Subjective: Pt comments on slight increased L ankle discomfort after increased home ADL'S yesterday  Objective: See treatment diary below  Added rocker board for DF/PF ROM with stretch  Pt instructed in stair gastroc stretch for home  Assessment: Tolerated treatment well  Patient demonstrated fatigue post treatment      Plan: Continue per plan of care        Manual   10/28  10/30 11/4 11/6 10/24   STM  5     5 5   AAROM  5  10  ------> 5 5   MT  5      5                                 Exercise Diary   10/28  10/30 11/4 11/6 10/24   Ankle pumps  1 5# 30x  home  home home #4 x 30   Ankle IV/EV  1 5# 30x  home  home home #4 x 30               Towel stretch knee straight  5X 20"  5 x20'  home 5 x 20" 5 x 20"   Towel stretch knee bent  5X 20"  5 X 20' home 5 x 20" 5 x 20    T-band DF  L3 3/10  L4 3/10 L4 3/10 L4 2/15 Blue x 30   T-band PF  L3 3/10  L4 3/10  L4 3/10 D/C Blue x 30    T-band EV/IV  L3 3/10  L4 3/10  L4 3/10 L4 2/15 Blue x 30    SAQ  4# 3/10 xxxxx  xxxxx xxxxx #6 x 30    LAQ  4# 3/10  xxxxxx xxxxx xxxxx #6 x 30    Nu-step S8 A9  6m L1  10m L3 10m L3 10m L3    Standing toe ups 20x 20x 2/10 20x X 30   Standing mini squats 2/10 -----> ---> ---> X 10   Gait             walker/sneaker ----> 10m  10m 10m x5   BAPS  L1 f/b,s/s  cw/ccw ------> L1 20x ea    LF ext  25# 2/10 -----> 25# 2/10    LF curl  35#2/10 ------> 35# 2/10    LF leg press  10# 15x -------> 10# 2/20    SLS  3x 20" --------> 3x 20"    Ohmx board    20x       Modalities 10/28  10/30 11/4 11/6 10/22   Cold pack Declined   declin declin HP pre Declined

## 2019-11-07 ENCOUNTER — APPOINTMENT (OUTPATIENT)
Dept: PHYSICAL THERAPY | Facility: CLINIC | Age: 49
End: 2019-11-07
Payer: COMMERCIAL

## 2019-11-11 ENCOUNTER — OFFICE VISIT (OUTPATIENT)
Dept: PHYSICAL THERAPY | Facility: CLINIC | Age: 49
End: 2019-11-11
Payer: COMMERCIAL

## 2019-11-11 DIAGNOSIS — Z98.890 STATUS POST OPEN REDUCTION WITH INTERNAL FIXATION (ORIF) OF FRACTURE OF ANKLE: ICD-10-CM

## 2019-11-11 DIAGNOSIS — Z87.81 STATUS POST OPEN REDUCTION WITH INTERNAL FIXATION (ORIF) OF FRACTURE OF ANKLE: ICD-10-CM

## 2019-11-11 DIAGNOSIS — S82.392F FRACTURE OF POSTERIOR MALLEOLUS, LEFT, OPEN TYPE III, WITH ROUTINE HEALING, SUBSEQUENT ENCOUNTER: Primary | ICD-10-CM

## 2019-11-11 PROCEDURE — 97140 MANUAL THERAPY 1/> REGIONS: CPT

## 2019-11-11 PROCEDURE — 97110 THERAPEUTIC EXERCISES: CPT

## 2019-11-11 NOTE — LETTER
2019    Quoc Maxwell MD  [address]    Patient: Cortes Aleman   YOB: 1970   Date of Visit: 2019     Encounter Diagnosis     ICD-10-CM    1  Fracture of posterior malleolus, left, open type III, with routine healing, subsequent encounter S82 392F    2  Status post open reduction with internal fixation (ORIF) of fracture of ankle Z98 890     Z87 81        Dear Dr Amaya Urena: Thank you for your recent referral of Cortes Aleman  Please review the attached evaluation summary from [place]'s recent visit  Please verify that you agree with the plan of care by signing the attached order  If you have any questions or concerns, please do not hesitate to call  I sincerely appreciate the opportunity to share in the care of one of your patients and hope to have another opportunity to work with you in the near future  Sincerely,    Colletta Faster, PT      Referring Provider:      I certify that I have read the below Plan of Care and certify the need for these services furnished under this plan of treatment while under my care  Quoc Maxwell MD  [address] [place] [address] Street: [phone]          Reassessment     Today's date: 2019  Patient name: Cortes Aleman  : 1970  MRN: [id number]  Referring provider: Aliya lOiva MD  Dx:   Encounter Diagnosis     ICD-10-CM    1  Fracture of posterior malleolus, left, open type III, with routine healing, subsequent encounter S82 392F    2  Status post open reduction with internal fixation (ORIF) of fracture of ankle Z98 890     Z87 81        Start Time: 1326    Assessment details: [place] Carvajal Ron 44 y o  female  Is post ORIF for type III open fracture of distal left tibia 2019  The patient fell at home when her porch collapsed 2019 and she twisted and fractured her left ankle    She no longer is wearing a boot and is ambulating in conventional shoe gear  Her gait remains antalgic but her stride length is equal   She experiences pain in the left ankle at the end of heel strike toward the beginning of toe off which is probably due to limited dorsiflexion  She is ambulating without assistive device  She has been seen for 21 out patient therapy visits for AROM, stretching, strengthening, and gait training  Left ankle pain rated 4/10 during weight bearing  And 5-6/10 with stretching  Left Ankle AROM: DF 10  PF 4 5 IV  and EV approaching functional limits          Left ankle Ankle Strength: DF=-4 , PF=-2, IV=  2+, EV= 2 +  Swelling Left ankle  Circumference measurements mid joint  26 3 cm mid foot 24  8        Goals  STG:  (1) Reduce left ankle pain by 25% 2 weeks progressing  (2) increase active ROM right left ankle by 5-8 degrees in all planes 3 weeks progressing  (3) increase strength  left ankle by 1/2 grade 4 weeks met   (4) progress in weight bearing per orthopedic recommendations  WBAT  - day one and ongoing progressing  (5) improve gait pattern with  Boot and progress without  Walker - when permitted)  met  (7) increase ambulation tolerance to 20 min  of continuous walking    LTG  Maximize AROM and strength left ankle by discharge progressing  Promote ambulation without boot and device by discharge met  Resume normal functional activity, including driving, work, and [id number] details:   2 X per week 6  weeks  MT - STM, stretching  TE stretching, ROM and strengthening include HEP  GT               Subjective: I still get sore when I stand up from sitting      Objective: See treatment diary below      Assessment: Tolerated treatment well  Patient would benefit from continued PT      Plan: Reassessment performed  Plan of care and goals updated    Progress note sent to MD     Manual   10/28  10/30 11/4 11/6 11/11   STM  5     5 5   AAROM  5  10  ------> 5 5   MT  5      5                                 Exercise Diary   10/28  10/30 11/4 11/6 11/11   Ankle pumps  1 5# 30x  home  home home D/c   Ankle IV/EV  1 5# 30x  home  home home D/c                 Towel stretch knee straight  5X 20"  5 x20'  home 5 x 20" HEP   Towel stretch knee bent  5X 20"  5 X 20' home 5 x 20" HEP    T-band DF  L3 3/10  L4 3/10 L4 3/10 L4 2/15 Blue x 30   T-band PF  L3 3/10  L4 3/10  L4 3/10 D/C Blue x 30    T-band EV/IV  L3 3/10  L4 3/10  L4 3/10 L4 2/15 Blue x 30    SAQ  4# 3/10 xxxxx  xxxxx xxxxx D/c    LAQ  4# 3/10  xxxxxx xxxxx xxxxx D/c    Nu-step S8 A9  6m L1  10m L3 10m L3 10m L3 D/c   Standing toe ups 20x 20x 2/10 20x X 30   Standing mini squats 2/10 -----> ---> ---> X 10   Gait TM         8 x 2 min     walker/sneaker ----> 10m  10m 10m    BAPS  L1 f/b,s/s  cw/ccw ------> L1 20x ea L1 x 25 ea   LF ext  25# 2/10 -----> 25# 2/10    LF curl  35#2/10 ------> 35# 2/10    LF leg press  10# 15x -------> 10# 2/20    SLS  3x 20" --------> 3x 20" 3 x 20"   Standing lunges against wall knee flexed/ knee extendsed     3 x 10 sec   Tandem stand     3 x 15 sec ea           Rocker board    20x x20         Modalities 10/28  10/30 11/4 11/6 11/11   Cold pack Declined   declin declin HP pre Declined

## 2019-11-11 NOTE — PROGRESS NOTES
Reassessment     Today's date: 2019  Patient name: Rosa Isela Elise  : 1970  MRN: [id number]  Referring provider: Mame Nevarez MD  Dx:   Encounter Diagnosis     ICD-10-CM    1  Fracture of posterior malleolus, left, open type III, with routine healing, subsequent encounter S82 392F    2  Status post open reduction with internal fixation (ORIF) of fracture of ankle Z98 890     Z87 81        Start Time: 1326    Assessment details: [place] 44 y o  female  Is post ORIF for type III open fracture of distal left tibia 2019  The patient fell at home when her porch collapsed 2019 and she twisted and fractured her left ankle  She no longer is wearing a boot and is ambulating in conventional shoe gear  Her gait remains antalgic but her stride length is equal   She experiences pain in the left ankle at the end of heel strike toward the beginning of toe off which is probably due to limited dorsiflexion  She is ambulating without assistive device  She has been seen for 21 out patient therapy visits for AROM, stretching, strengthening, and gait training  Left ankle pain rated 4/10 during weight bearing  And 5-6/10 with stretching  Left Ankle AROM: DF 10  PF 45 IV and EV approaching functional limits          Left ankle Ankle Strength: DF=-4 , PF=-2, IV= 2+, EV= 2+  Swelling Left ankle  Circumference measurements mid joint  26 3 cm mid foot 24  8        Goals  STG:  (1) Reduce left ankle pain by 25% 2 weeks progressing  (2) increase active ROM right left ankle by 5-8 degrees in all planes 3 weeks progressing  (3) increase strength  left ankle by 1/2 grade 4 weeks met   (4) progress in weight bearing per orthopedic recommendations  WBAT  - day one and ongoing progressing  (5) improve gait pattern with  Boot and progress without  Walker - when permitted)  met  (7) increase ambulation tolerance to 20 min   of continuous walking    LTG  Maximize AROM and strength left ankle by discharge progressing  Promote ambulation without boot and device by discharge met  Resume normal functional activity, including driving, work, and [id number] details:   2 X per week 6  weeks  MT - STM, stretching  TE stretching, ROM and strengthening include HEP  GT               Subjective: I still get sore when I stand up from sitting      Objective: See treatment diary below      Assessment: Tolerated treatment well  Patient would benefit from continued PT      Plan: Reassessment performed  Plan of care and goals updated    Progress note sent to MD     Manual   10/28  10/30 11/4 11/6 11/11   STM  5     5 5   AAROM  5  10  ------> 5 5   MT  5      5                                 Exercise Diary   10/28  10/30 11/4 11/6 11/11   Ankle pumps  1 5# 30x  home  home home D/c   Ankle IV/EV  1 5# 30x  home  home home D/c                 Towel stretch knee straight  5X 20"  5 x20'  home 5 x 20" HEP   Towel stretch knee bent  5X 20"  5 X 20' home 5 x 20" HEP    T-band DF  L3 3/10  L4 3/10 L4 3/10 L4 2/15 Blue x 30   T-band PF  L3 3/10  L4 3/10  L4 3/10 D/C Blue x 30    T-band EV/IV  L3 3/10  L4 3/10  L4 3/10 L4 2/15 Blue x 30    SAQ  4# 3/10 xxxxx  xxxxx xxxxx D/c    LAQ  4# 3/10  xxxxxx xxxxx xxxxx D/c    Nu-step S8 A9  6m L1  10m L3 10m L3 10m L3 D/c   Standing toe ups 20x 20x 2/10 20x X 30   Standing mini squats 2/10 -----> ---> ---> X 10   Gait TM         8 x 2 min     walker/sneaker ----> 10m  10m 10m    BAPS  L1 f/b,s/s  cw/ccw ------> L1 20x ea L1 x 25 ea   LF ext  25# 2/10 -----> 25# 2/10    LF curl  35#2/10 ------> 35# 2/10    LF leg press  10# 15x -------> 10# 2/20    SLS  3x 20" --------> 3x 20" 3 x 20"   Standing lunges against wall knee flexed/ knee extendsed     3 x 10 sec   Tandem stand     3 x 15 sec ea           Rocker board    20x x20         Modalities 10/28  10/30 11/4 11/6 11/11   Cold pack Declined   declin declin HP pre Declined

## 2019-11-14 ENCOUNTER — OFFICE VISIT (OUTPATIENT)
Dept: PHYSICAL THERAPY | Facility: CLINIC | Age: 49
End: 2019-11-14
Payer: COMMERCIAL

## 2019-11-14 DIAGNOSIS — Z98.890 STATUS POST OPEN REDUCTION WITH INTERNAL FIXATION (ORIF) OF FRACTURE OF ANKLE: ICD-10-CM

## 2019-11-14 DIAGNOSIS — Z87.81 STATUS POST OPEN REDUCTION WITH INTERNAL FIXATION (ORIF) OF FRACTURE OF ANKLE: ICD-10-CM

## 2019-11-14 DIAGNOSIS — S82.392F FRACTURE OF POSTERIOR MALLEOLUS, LEFT, OPEN TYPE III, WITH ROUTINE HEALING, SUBSEQUENT ENCOUNTER: Primary | ICD-10-CM

## 2019-11-14 PROCEDURE — 97140 MANUAL THERAPY 1/> REGIONS: CPT

## 2019-11-14 PROCEDURE — 97110 THERAPEUTIC EXERCISES: CPT

## 2019-11-14 NOTE — PROGRESS NOTES
Daily Note     Today's date: 2019  Patient name: Sukmuar Hein  : 1970  MRN: [id number]  Referring provider: Alisa Franklin MD  Dx:   Encounter Diagnosis     ICD-10-CM    1  Fracture of posterior malleolus, left, open type III, with routine healing, subsequent encounter S82 392F    2  Status post open reduction with internal fixation (ORIF) of fracture of ankle Z98 890     Z87 81                   Subjective: Pt  reports her ankle is sore today  Objective: See treatment diary below      Assessment: Tolerated treatment fair  Patient demonstrated fatigue post treatment, exhibited good technique with therapeutic exercises and would benefit from continued PT  Pt  continues with antalgic gait  VC t/o RX for increase heel strike and lateral weight shift for normalized gait pattern  Plan: Progress treatment as tolerated         Manual   11/14  10/30 11/4 11/6 11/11   STM  5     5 5   AAROM  5  10  ------> 5 5   MT  5      5                                 Exercise Diary   11/14  10/30 11/4 11/6 11/11   Ankle pumps    home  home home D/c   Ankle IV/EV    home  home home D/c                 Towel stretch knee straight   5 x20'  home 5 x 20" HEP   Towel stretch knee bent    5 X 20' home 5 x 20" HEP    T-band DF  L4 3/10  L4 3/10 L4 3/10 L4 2/15 Blue x 30   T-band PF  L4 3/10  L4 3/10  L4 3/10 D/C Blue x 30    T-band EV/IV  L4 3/10  L4 3/10  L4 3/10 L4 2/15 Blue x 30    SAQ  xxxxx  xxxxx xxxxx D/c    LAQ   xxxxxx xxxxx xxxxx D/c    Nu-step S8 A9   10m L3 10m L3 10m L3 D/c   Standing toe ups 30x 20x 2/10 20x X 30   Standing mini squats 2/10 -----> ---> ---> X 10   Gait TM   8x3'       8 x 2 min     walker/sneaker ----> 10m  10m 10m    BAPS L1 x 25 ea L1 f/b,s/s  cw/ccw ------> L1 20x ea L1 x 25 ea   LF ext  25# 2/10 -----> 25# 2/10    LF curl  35#2/10 ------> 35# 2/10    LF leg press  10# 15x -------> 10# 2/20    SLS 3x20" 3x 20" --------> 3x 20" 3 x 20"   Standing lunges against wall knee flexed/ knee extendsed 3x10"    3 x 10 sec   Tandem stand 3x15"    3 x 15 sec ea           Rocker board 20x   20x x20         Modalities 11/14  10/30 11/4 11/6 11/11   Cold pack Declined   declin declin HP pre Declined

## 2019-11-18 ENCOUNTER — OFFICE VISIT (OUTPATIENT)
Dept: PHYSICAL THERAPY | Facility: CLINIC | Age: 49
End: 2019-11-18
Payer: COMMERCIAL

## 2019-11-18 DIAGNOSIS — Z87.81 STATUS POST OPEN REDUCTION WITH INTERNAL FIXATION (ORIF) OF FRACTURE OF ANKLE: ICD-10-CM

## 2019-11-18 DIAGNOSIS — S82.392F FRACTURE OF POSTERIOR MALLEOLUS, LEFT, OPEN TYPE III, WITH ROUTINE HEALING, SUBSEQUENT ENCOUNTER: Primary | ICD-10-CM

## 2019-11-18 DIAGNOSIS — Z98.890 STATUS POST OPEN REDUCTION WITH INTERNAL FIXATION (ORIF) OF FRACTURE OF ANKLE: ICD-10-CM

## 2019-11-18 PROCEDURE — 97010 HOT OR COLD PACKS THERAPY: CPT

## 2019-11-18 PROCEDURE — 97110 THERAPEUTIC EXERCISES: CPT

## 2019-11-18 PROCEDURE — 97140 MANUAL THERAPY 1/> REGIONS: CPT

## 2019-11-18 NOTE — PROGRESS NOTES
Daily Note     Today's date: 2019  Patient name: Sohan Duque  : 1970  MRN: [id number]  Referring provider: Armen Sousa MD  Dx:   Encounter Diagnosis     ICD-10-CM    1  Fracture of posterior malleolus, left, open type III, with routine healing, subsequent encounter S82 392F    2  Status post open reduction with internal fixation (ORIF) of fracture of ankle Z98 890     Z87 81        Start Time: 0100  Stop Time: 1400  Total time in clinic (min): 780 minutes    Subjective: Pt notes continued L ankle soreness after change in her footwear on the weekend  Objective: See treatment diary below  PTA discussed the need for more supportive footwear  Deferred several exercises due to time restraints  Assessment: Tolerated treatment well  Patient exhibited good technique with therapeutic exercises      Plan: Continue per plan of care        Manual      STM  5     5 5   AAROM  5  10  ------> 5 5   MT  5      5                                 Exercise Diary      Ankle pumps    home  home home D/c   Ankle IV/EV    home  home home D/c                 Towel stretch knee straight   5 x20'  home 5 x 20" HEP   Towel stretch knee bent    5 X 20' home 5 x 20" HEP    T-band DF  L4 3/10  L4 3/10 L4 3/10 L4 2/15 Blue x 30   T-band PF  L4 3/10  xxxx dc  L4 3/10 D/C Blue x 30    T-band EV/IV  L4 3/10  L4 3/10  L4 3/10 L4 2/15 Blue x 30   Standing toe ups 30x 30x 2/10 20x X 30   Standing mini squats 2/10 2/10 ---> ---> X 10   Gait TM   8x3'  defer -->     8 x 2 min     walker/sneaker ---->   10m 10m    BAPS L1 x 25 ea L1 f/b,s/s  cw/ccw ------> L1 20x ea L1 x 25 ea   LF ext  dc -----> 25# 2/10    LF curl  dc ------> 35# 2/10    LF leg press  ----> -------> 10# 2/20    SLS 3x20" 3x 20" --------> 3x 20" 3 x 20"   Standing lunges against wall knee flexed/ knee extendsed 3x10" 3x10"   3 x 10 sec   Tandem stand 3x15" 3x 15"   3 x 15 sec ea           [employer] board 20x --->  20x x20         Modalities 11/14 11/18 11/4 11/6 11/11   Cold pack Declined   hp pre declin HP pre Declined

## 2019-11-19 ENCOUNTER — OFFICE VISIT (OUTPATIENT)
Dept: PHYSICAL THERAPY | Facility: CLINIC | Age: 49
End: 2019-11-19
Payer: COMMERCIAL

## 2019-11-19 DIAGNOSIS — Z98.890 STATUS POST OPEN REDUCTION WITH INTERNAL FIXATION (ORIF) OF FRACTURE OF ANKLE: ICD-10-CM

## 2019-11-19 DIAGNOSIS — Z87.81 STATUS POST OPEN REDUCTION WITH INTERNAL FIXATION (ORIF) OF FRACTURE OF ANKLE: ICD-10-CM

## 2019-11-19 DIAGNOSIS — S82.392F FRACTURE OF POSTERIOR MALLEOLUS, LEFT, OPEN TYPE III, WITH ROUTINE HEALING, SUBSEQUENT ENCOUNTER: Primary | ICD-10-CM

## 2019-11-19 PROCEDURE — 97110 THERAPEUTIC EXERCISES: CPT

## 2019-11-19 NOTE — PROGRESS NOTES
Daily Note     Today's date: 2019  Patient name: Natividad Carrera  : 1970  MRN: [id number]  Referring provider: Melissa Lemon MD  Dx:   Encounter Diagnosis     ICD-10-CM    1  Fracture of posterior malleolus, left, open type III, with routine healing, subsequent encounter S82 392F    2  Status post open reduction with internal fixation (ORIF) of fracture of ankle Z98 890     Z87 81        Start Time: 1100  Stop Time: 1140  Total time in clinic (min): 40 minutes    Subjective: Pt notes less discomfort today  Objective: See treatment diary below  Held manual stretch today  Assessment: Tolerated treatment well  Patient exhibited good technique with therapeutic exercises      Plan: Continue per plan of care        Manual      STM  5     5 5   AAROM  5  10   5 5   MT  5      5                                 Exercise Diary                  Towel stretch knee straight   5 x20' 5 x 20" HEP    Towel stretch knee bent    5 X 20' 5 x 20" HEP     T-band DF  L4 3/10  L4 3/10 L4 2/15 Blue x 30     T-band EV/IV  L4 3/10  L4 3/10 L4 2/15 Blue x 30    Standing toe ups 30x 30x 20x X 30    Standing mini squats 2/10 2/10 ---> X 10    Gait TM   8x3'  defer -->   8 x 2 min    BAPS s/s  cw/ccw L1 x 25 ea L1 f/b,s/s  cw/ccw L1 20x ea L1 x 25 ea    LF leg press  ----> --->     SLS 3x20" 3x 20" 3x 20" 3 x 20"    Standing lunges against wall knee flexed/ knee extendsed 3x10" 3x10" 3x 10" 3 x 10 sec    Tandem stand 3x15" 3x 15" 3x 15" 3 x 15 sec ea            Rocker board 20x ---> ----> x20          Modalities    Cold pack Declined   hp pre declin HP pre Declined

## 2019-11-20 ENCOUNTER — APPOINTMENT (OUTPATIENT)
Dept: PHYSICAL THERAPY | Facility: CLINIC | Age: 49
End: 2019-11-20
Payer: COMMERCIAL

## 2019-11-27 ENCOUNTER — TRANSCRIBE ORDERS (OUTPATIENT)
Dept: PHYSICAL THERAPY | Facility: CLINIC | Age: 49
End: 2019-11-27

## 2019-11-27 DIAGNOSIS — S82.392F FRACTURE OF POSTERIOR MALLEOLUS, LEFT, OPEN TYPE III, WITH ROUTINE HEALING, SUBSEQUENT ENCOUNTER: Primary | ICD-10-CM

## 2019-11-27 DIAGNOSIS — Z87.81 STATUS POST OPEN REDUCTION WITH INTERNAL FIXATION (ORIF) OF FRACTURE OF ANKLE: ICD-10-CM

## 2019-11-27 DIAGNOSIS — Z98.890 STATUS POST OPEN REDUCTION WITH INTERNAL FIXATION (ORIF) OF FRACTURE OF ANKLE: ICD-10-CM

## 2019-12-03 ENCOUNTER — OFFICE VISIT (OUTPATIENT)
Dept: PHYSICAL THERAPY | Facility: CLINIC | Age: 49
End: 2019-12-03
Payer: COMMERCIAL

## 2019-12-03 DIAGNOSIS — S82.392F FRACTURE OF POSTERIOR MALLEOLUS, LEFT, OPEN TYPE III, WITH ROUTINE HEALING, SUBSEQUENT ENCOUNTER: Primary | ICD-10-CM

## 2019-12-03 DIAGNOSIS — Z87.81 STATUS POST OPEN REDUCTION WITH INTERNAL FIXATION (ORIF) OF FRACTURE OF ANKLE: ICD-10-CM

## 2019-12-03 DIAGNOSIS — Z98.890 STATUS POST OPEN REDUCTION WITH INTERNAL FIXATION (ORIF) OF FRACTURE OF ANKLE: ICD-10-CM

## 2019-12-03 PROCEDURE — 97110 THERAPEUTIC EXERCISES: CPT

## 2019-12-03 PROCEDURE — 97140 MANUAL THERAPY 1/> REGIONS: CPT

## 2019-12-03 NOTE — PROGRESS NOTES
Daily Note     Today's date: 12/3/2019  Patient name: Tawny Bustamante  : 1970  MRN: [id number]  Referring provider: Maxim Burnett MD  Dx:   Encounter Diagnosis     ICD-10-CM    1  Fracture of posterior malleolus, left, open type III, with routine healing, subsequent encounter S82 392F    2  Status post open reduction with internal fixation (ORIF) of fracture of ankle Z98 890     Z87 81        Start Time: 1300  Stop Time: 1350  Total time in clinic (min): 50 minutes    Subjective: Patient returns to PT following a vacation, she reported some discomfort in her foot  Objective: PTA directed patient in TE program, See treatment diary below  Assessment: Tolerated treatment well  Patient exhibited good technique with therapeutic exercises      Plan: Continue per plan of care        Manual   11/14 11/18 11/19 12/3 11/11   STM  5     5 5   AAROM  5  10   5 5   MT  5      5                                 Exercise Diary   11/14  11/18 11/19 12/3               Towel stretch knee straight   5 x20' 5 x 20" 5x 20"    Towel stretch knee bent    5 X 20' 5 x 20" 5x 20"     T-band DF  L4 3/10  L4 3/10 L4 2/15 Blue x 30     T-band EV/IV  L4 3/10  L4 3/10 L4 2/15 Blue x 30    Standing toe ups 30x 30x 20x X 30    Standing mini squats 2/10 2/10 ---> 2/10    Gait TM   8x3'  defer -->   8 x 2 min    BAPS s/s  cw/ccw L1 x 25 ea L1 f/b,s/s  cw/ccw L1 20x ea L1 x 25 ea    LF leg press  ----> --->     SLS 3x20" 3x 20" 3x 20" 3 x 20"    Standing lunges against wall knee flexed/ knee extendsed 3x10" 3x10" 3x 10" 3 x 10 sec    Tandem stand 3x15" 3x 15" 3x 15" 3 x 15 sec ea            Rocker board 20x ---> ----> x20          Modalities 11/14  11/18 11/19 12/3 11/11   Cold pack Declined   hp pre declin  Declined

## 2019-12-05 ENCOUNTER — OFFICE VISIT (OUTPATIENT)
Dept: PHYSICAL THERAPY | Facility: CLINIC | Age: 49
End: 2019-12-05
Payer: COMMERCIAL

## 2019-12-05 DIAGNOSIS — Z87.81 STATUS POST OPEN REDUCTION WITH INTERNAL FIXATION (ORIF) OF FRACTURE OF ANKLE: ICD-10-CM

## 2019-12-05 DIAGNOSIS — S82.392F FRACTURE OF POSTERIOR MALLEOLUS, LEFT, OPEN TYPE III, WITH ROUTINE HEALING, SUBSEQUENT ENCOUNTER: Primary | ICD-10-CM

## 2019-12-05 DIAGNOSIS — Z98.890 STATUS POST OPEN REDUCTION WITH INTERNAL FIXATION (ORIF) OF FRACTURE OF ANKLE: ICD-10-CM

## 2019-12-05 PROCEDURE — 97140 MANUAL THERAPY 1/> REGIONS: CPT

## 2019-12-05 PROCEDURE — 97110 THERAPEUTIC EXERCISES: CPT

## 2019-12-05 NOTE — PROGRESS NOTES
Daily Note     Today's date: 2019  Patient name: Jermaine Cormier  : 1970  MRN: [id number]  Referring provider: Erika Palacio MD  Dx:   Encounter Diagnosis     ICD-10-CM    1  Fracture of posterior malleolus, left, open type III, with routine healing, subsequent encounter S82 392F    2  Status post open reduction with internal fixation (ORIF) of fracture of ankle Z98 890     Z87 81        Start Time: 1500  Stop Time: 1600  Total time in clinic (min): 60 minutes    Subjective: Pt notes increased soreness with the cold weather  MD F/U 19  Objective: See treatment diary below      Assessment: Tolerated treatment well  Patient exhibited good technique with therapeutic exercises      Plan: Continue per plan of care  Manual   11/14 11/18 11/19 12/3 12/5   STM  5     5 5   AAROM  5  10   5 5   MT  5                                       Exercise Diary   11/14  11/18 11/19 12/3 12/5              Towel stretch knee straight   5 x20' 5 x 20" 5x 20" 5x 20"   Towel stretch knee bent    5 X 20' 5 x 20" 5x 20" 5x 20"    T-band DF  L4 3/10  L4 3/10 L4 2/15 Blue x 30 L4 30x    T-band EV/IV  L4 3/10  L4 3/10 L4 2/15 Blue x 30 L4 30x   Standing toe ups 30x 30x 20x X 30 30x   Standing mini squats 2/10 210 ---> 2/10 210   Gait TM   8x3'  defer -->   8 x 2 min   8 mph  3m     BAPS s/s  cw/ccw L1 x 25 ea L1 f/b,s/s  cw/ccw L1 20x ea L1 x 25 ea L1 25x   LF leg press  ----> --->  10# 1/10   SLS 3x20" 3x 20" 3x 20" 3 x 20"    Standing lunges against wall knee flexed/ knee extendsed 3x10" 3x10" 3x 10" 3 x 10 sec 3x 10"   Tandem stand 3x15" 3x 15" 3x 15" 3 x 15 sec ea 3x 15"           Rocker board 20x ---> ----> x20 20x         Modalities 11/14  11/18 11/19 12/3 12/5   Cold pack Declined   hp pre declin  Declined

## 2019-12-11 ENCOUNTER — EVALUATION (OUTPATIENT)
Dept: PHYSICAL THERAPY | Facility: CLINIC | Age: 49
End: 2019-12-11
Payer: COMMERCIAL

## 2019-12-11 DIAGNOSIS — S82.392F FRACTURE OF POSTERIOR MALLEOLUS, LEFT, OPEN TYPE III, WITH ROUTINE HEALING, SUBSEQUENT ENCOUNTER: Primary | ICD-10-CM

## 2019-12-11 DIAGNOSIS — Z87.81 STATUS POST OPEN REDUCTION WITH INTERNAL FIXATION (ORIF) OF FRACTURE OF ANKLE: ICD-10-CM

## 2019-12-11 DIAGNOSIS — Z98.890 STATUS POST OPEN REDUCTION WITH INTERNAL FIXATION (ORIF) OF FRACTURE OF ANKLE: ICD-10-CM

## 2019-12-11 PROCEDURE — 97110 THERAPEUTIC EXERCISES: CPT | Performed by: PHYSICAL THERAPIST

## 2019-12-11 PROCEDURE — 97140 MANUAL THERAPY 1/> REGIONS: CPT | Performed by: PHYSICAL THERAPIST

## 2019-12-11 PROCEDURE — 97116 GAIT TRAINING THERAPY: CPT | Performed by: PHYSICAL THERAPIST

## 2019-12-11 NOTE — PROGRESS NOTES
Reassessment     Today's date: 2019  Patient name: Roxanne Corral  : 1970  MRN: [id number]  Referring provider: Raghav Lantigua MD  Dx:   Encounter Diagnosis     ICD-10-CM    1  Fracture of posterior malleolus, left, open type III, with routine healing, subsequent encounter S82 392F    2  Status post open reduction with internal fixation (ORIF) of fracture of ankle Z98 890     Z87 81             Assessment details: [place] Trejo Ao 44 y o  female  Is post ORIF for type III open fracture of distal left tibia 2019  The patient fell at home when her porch collapsed 2019 and she twisted and fractured her left ankle  Pt has been seen for 27 visits with treatment consisting of gait training PROM joint mobilization and STM with ROM strengthening proprioception and balance training  Pt continues to make progress with PT with ROM nearly [facility] in all planes however pt still with varying degrees of pain based on activity level and time in standing  Pt demonstrating good improvements in strength all motions L ankle  Pt still limited with prolonged standing at 15-20 minutes and prolonged walking at 15-20 minutes  Pt still ascending/descending stairs with step to pattern and use of HR  Pt states she still has some difficulty performing household chores and has not been able to return to work due to limited standing and ambulation tolerance  Pt would benefit from continued activity in PT to normal gait pattern decrease pain improve DF AROM strength balance and tolerance for all activity  Will also await MD recommendation from follow up visit on 19  Left ankle pain rated 2-6/10 during weight bearing  And 5-7/10 with stretching  Left Ankle AROM: DF 10  PF 50 IV 20 and EV 30         Left ankle Ankle Strength: DF=4+, PF=4/5 with pain, IV= 4/5, EV= 4/5  Swelling Left ankle  Circumference measurements mid joint  26 3 cm mid foot 24 8   Swelling noted around malleoli but no mid foot swelling       Goals  STG:  (1) Reduce left ankle pain by 25% 2 weeks progressing  (2) increase active ROM right left ankle by 5-8 degrees in all planes 3 weeks met  (3) increase strength  left ankle by 1/2 grade 4 weeks met   (4) progress in weight bearing per orthopedic recommendations  WBAT  - day one and ongoing progressing  (5) improve gait pattern with  Boot and progress without  Walker - when permitted)  met  (7) increase ambulation tolerance to 20 min  of continuous walking- met    LTG  Maximize AROM and strength left ankle by discharge progressing  Promote ambulation without boot and device by discharge met  Resume normal functional activity, including driving, work, and recreation progressing but not met    Pt Goal:         To return to normal walking and to decrease pain  Plan  Plan details:   2 X per week 4  weeks  MT - STM, stretching  TE stretching, ROM and strengthening, gait training with HEP                 Subjective: Pt notes pain some days is better than other days  Sometimes she has more difficulty walking and standing than other times  Objective: See treatment diary below  See above for object measures  Assessment: Tolerated treatment well  Patient would benefit from continued PT to normalize gait pattern and improve standing walking and WB tolerance  Pt with improve gait pattern with gait training on treadmill at 1 2 mph with no limp however pt with some UE support on treadmill  Pt still with mild swelling present at mid joint near medial and lateral malleolus  Plan: Reassessment performed  Plan of care and goals updated    Progress note sent to MD      Manual   12/11 11/18 11/19 12/3 12/5   STM  5     5 5   AAROM  5  10   5 5   MT  5                                       Exercise Diary   12/11  11/18 11/19 12/3 12/5              Towel stretch knee straight 5x 20'  5 x20' 5 x 20" 5x 20" 5x 20"   Towel stretch knee bent  5 x20"  5 X 20' 5 x 20" 5x 20" 5x 20"    T-band DF  L4 3/10  L4 3/10 L4 2/15 Blue x 30 L4 30x    T-band EV/IV  L4 3/10  L4 3/10 L4 2/15 Blue x 30 L4 30x   Standing toe ups 30x 30x 20x X 30 30x   Standing mini squats 2/10 2/10 ---> 2/10 2/10   Gait TM   1 2 mph  5 min  defer -->   8 x 2 min   8 mph  3m     BAPS s/s  cw/ccw L1 x 25 ea L1 f/b,s/s  cw/ccw L1 20x ea L1 x 25 ea L1 25x   LF leg press 10#  ----> --->  10# 1/10   SLS 3x20" 3x 20" 3x 20" 3 x 20"    Standing lunges against wall knee flexed/ knee extendsed 3x10" 3x10" 3x 10" 3 x 10 sec 3x 10"   Tandem stand 3x 20" 3x 15" 3x 15" 3 x 15 sec ea 3x 15"           Rocker board 20x ---> ----> x20 20x         Modalities 12/11  11/18 11/19 12/3 12/5   Cold pack Declined   hp pre declin  Declined

## 2019-12-11 NOTE — LETTER
2019    Darrius Hameed MD  [address] [id number]    Patient: Addy Perez   YOB: 1970   Date of Visit: 2019     Encounter Diagnosis     ICD-10-CM    1  Fracture of posterior malleolus, left, open type III, with routine healing, subsequent encounter S82 392F    2  Status post open reduction with internal fixation (ORIF) of fracture of ankle Z98 890     Z87 81        Dear Dr Best Nations: Thank you for your recent referral of Addy Perez  Please review the attached evaluation summary from [place]'s recent visit  Please verify that you agree with the plan of care by signing the attached order  If you have any questions or concerns, please do not hesitate to call  I sincerely appreciate the opportunity to share in the care of one of your patients and hope to have another opportunity to work with you in the near future  Sincerely,    Cody Agarwal PT      Referring Provider:      I certify that I have read the below Plan of Care and certify the need for these services furnished under this plan of treatment while under my care  Darrius Hameed MD  [address]: [phone]          Reassessment     Today's date: 2019  Patient name: Addy Perez  : 1970  MRN: [id number]  Referring provider: Alejandrina Weldon MD  Dx:   Encounter Diagnosis     ICD-10-CM    1  Fracture of posterior malleolus, left, open type III, with routine healing, subsequent encounter S82 392F    2  Status post open reduction with internal fixation (ORIF) of fracture of ankle Z98 890     Z87 81             Assessment details: [place] Peres 44 y o  female  Is post ORIF for type III open fracture of distal left tibia 2019  The patient fell at home when her porch collapsed 2019 and she twisted and fractured her left ankle    Pt has been seen for 27 visits with treatment consisting of gait training PROM joint mobilization and STM with ROM strengthening proprioception and balance training  Pt continues to make progress with PT with ROM nearly [facility] in all planes however pt still with varying degrees of pain based on activity level and time in standing  Pt demonstrating good improvements in strength all motions L ankle  Pt still limited with prolonged standing at 15-20 minutes and prolonged walking at 15-20 minutes  Pt still ascending/descending stairs with step to pattern and use of HR  Pt states she still has some difficulty performing household chores and has not been able to return to work due to limited standing and ambulation tolerance  Pt would benefit from continued activity in PT to normal gait pattern decrease pain improve DF AROM strength balance and tolerance for all activity  Will also await MD recommendation from follow up visit on 12/17/19  Left ankle pain rated 2-6/10 during weight bearing  And 5-7/10 with stretching  Left Ankle AROM: DF 10  PF  50 IV  20 and EV 30         Left ankle Ankle Strength: DF=4+, PF=4/5 with pain, IV=  4/5, EV=  4/5  Swelling Left ankle  Circumference measurements mid joint  26 3 cm mid foot 24 8  Swelling noted around malleoli but no mid foot swelling       Goals  STG:  (1) Reduce left ankle pain by 25% 2 weeks progressing  (2) increase active ROM right left ankle by 5-8 degrees in all planes 3 weeks met  (3) increase strength  left ankle by 1/2 grade 4 weeks met   (4) progress in weight bearing per orthopedic recommendations  WBAT  - day one and ongoing progressing  (5) improve gait pattern with  Boot and progress without  Walker - when permitted)  met  (7) increase ambulation tolerance to 20 min   of continuous walking- met    LTG  Maximize AROM and strength left ankle by discharge progressing  Promote ambulation without boot and device by discharge met  Resume normal functional activity, including driving, work, and recreation progressing but not met    Pt Goal:         To return to normal walking and to decrease pain  Plan  Plan details:   2 X per week 4  weeks  MT - STM, stretching  TE stretching, ROM and strengthening, gait training with HEP                 Subjective: Pt notes pain some days is better than other days  Sometimes she has more difficulty walking and standing than other times  Objective: See treatment diary below  See above for object measures  Assessment: Tolerated treatment well  Patient would benefit from continued PT to normalize gait pattern and improve standing walking and WB tolerance  Pt with improve gait pattern with gait training on treadmill at 1 2 mph with no limp however pt with some UE support on treadmill  Pt still with mild swelling present at mid joint near medial and lateral malleolus  Plan: Reassessment performed  Plan of care and goals updated  Progress note sent to MD      Manual   12/11 11/18 11/19 12/3 12/5   STM  5     5 5   AAROM  5  10   5 5   MT  5                                       Exercise Diary   12/11  11/18 11/19 12/3 12/5              Towel stretch knee straight 5x 20'  5 x20' 5 x 20" 5x 20" 5x 20"   Towel stretch knee bent  5 x20"  5 X 20' 5 x 20" 5x 20" 5x 20"    T-band DF  L4 3/10  L4 3/10 L4 2/15 Blue x 30 L4 30x    T-band EV/IV  L4 3/10  L4 3/10 L4 2/15 Blue x 30 L4 30x   Standing toe ups 30x 30x 20x X 30 30x   Standing mini squats 2/10 2/10 ---> 2/10 2/10   Gait TM   1 2 mph  5 min  defer -->   8 x 2 min   8 mph  3m     BAPS s/s  cw/ccw L1 x 25 ea L1 f/b,s/s  cw/ccw L1 20x ea L1 x 25 ea L1 25x   LF leg press 10#  ----> --->  10# 1/10   SLS 3x20" 3x 20" 3x 20" 3 x 20"    Standing lunges against wall knee flexed/ knee extendsed 3x10" 3x10" 3x 10" 3 x 10 sec 3x 10"   Tandem stand 3x 20" 3x 15" 3x 15" 3 x 15 sec ea 3x 15"           Rocker board 20x ---> ----> x20 20x         Modalities 12/11  11/18 11/19 12/3 12/5   Cold pack Declined   hp pre declin  Declined

## 2019-12-12 ENCOUNTER — OFFICE VISIT (OUTPATIENT)
Dept: PHYSICAL THERAPY | Facility: CLINIC | Age: 49
End: 2019-12-12
Payer: COMMERCIAL

## 2019-12-12 ENCOUNTER — TRANSCRIBE ORDERS (OUTPATIENT)
Dept: PHYSICAL THERAPY | Facility: CLINIC | Age: 49
End: 2019-12-12

## 2019-12-12 DIAGNOSIS — Z87.81 STATUS POST OPEN REDUCTION WITH INTERNAL FIXATION (ORIF) OF FRACTURE OF ANKLE: ICD-10-CM

## 2019-12-12 DIAGNOSIS — Z98.890 STATUS POST OPEN REDUCTION WITH INTERNAL FIXATION (ORIF) OF FRACTURE OF ANKLE: ICD-10-CM

## 2019-12-12 DIAGNOSIS — S82.392F FRACTURE OF POSTERIOR MALLEOLUS, LEFT, OPEN TYPE III, WITH ROUTINE HEALING, SUBSEQUENT ENCOUNTER: Primary | ICD-10-CM

## 2019-12-12 PROCEDURE — 97116 GAIT TRAINING THERAPY: CPT

## 2019-12-12 PROCEDURE — 97110 THERAPEUTIC EXERCISES: CPT

## 2019-12-12 NOTE — PROGRESS NOTES
Daily Note     Today's date: 2019  Patient name: Addy Perez  : 1970  MRN: [id number]  Referring provider: Alejandrina Weldon MD  Dx:   Encounter Diagnosis     ICD-10-CM    1  Fracture of posterior malleolus, left, open type III, with routine healing, subsequent encounter S82 392F    2  Status post open reduction with internal fixation (ORIF) of fracture of ankle Z98 890     Z87 81        Start Time: 1500  Stop Time: 1600  Total time in clinic (min): 60 minutes    Subjective: Pt feels she is walking better and has more motion  She notes weather related stiffness today  Objective: See treatment diary below      Assessment: Tolerated treatment well  Patient exhibited good technique with therapeutic exercises      Plan: Continue per plan of care  Manual   12/11 12/12 11/19 12/3 12/5   [facility]  home     5 5   AAROM  ---->  10   5 5                     Exercise Diary   12/11  12/12 11/19 12/3 12/5              Towel stretch knee straight/bent 5x 20" ea  5 x20' 5 x 20" 5x 20" 5x 20"    T-band DF  L4 3/10  L4 3/10 L4 2/15 Blue x 30 L4 30x    T-band ev/ir    L4 3/10  L4 3/10 L4 2/15 Blue x 30 L4 30x   Standing toe ups 30x 30x 20x X 30 30x   Standing mini squats 2/10 2/10 ---> 2/10 210   Gait TM   1 2 mph  10 m  defer -->   8 x 2 min   8 mph  3m     BAPS s/s  cw/ccw L2 25x L1 f/b,s/s  cw/ccw L1 20x ea L1 x 25 ea L1 25x   LF leg press 10# 1/10 ----> --->  10# 1/10   SLS 3x20" 3x 20" 3x 20" 3 x 20"    Standing lunges against wall knee flexed/ knee extendsed 3x10" 3x10" 3x 10" 3 x 10 sec 3x 10"   Tandem stand 3x 20" 3x 15" 3x 15" 3 x 15 sec ea 3x 15"           Rocker board 20x    20x ----> x20 20x

## 2019-12-16 ENCOUNTER — OFFICE VISIT (OUTPATIENT)
Dept: PHYSICAL THERAPY | Facility: CLINIC | Age: 49
End: 2019-12-16
Payer: COMMERCIAL

## 2019-12-16 DIAGNOSIS — Z87.81 STATUS POST OPEN REDUCTION WITH INTERNAL FIXATION (ORIF) OF FRACTURE OF ANKLE: ICD-10-CM

## 2019-12-16 DIAGNOSIS — Z98.890 STATUS POST OPEN REDUCTION WITH INTERNAL FIXATION (ORIF) OF FRACTURE OF ANKLE: ICD-10-CM

## 2019-12-16 DIAGNOSIS — S82.392F FRACTURE OF POSTERIOR MALLEOLUS, LEFT, OPEN TYPE III, WITH ROUTINE HEALING, SUBSEQUENT ENCOUNTER: Primary | ICD-10-CM

## 2019-12-16 PROCEDURE — 97116 GAIT TRAINING THERAPY: CPT

## 2019-12-16 PROCEDURE — 97110 THERAPEUTIC EXERCISES: CPT

## 2019-12-16 NOTE — LETTER
January 10, 2020    Srinivasa Taylor MD  [address]    Patient: Juice Cha   YOB: 1970   Date of Visit: 2019     Encounter Diagnosis     ICD-10-CM    1  Fracture of posterior malleolus, left, open type III, with routine healing, subsequent encounter S82 392F    2  Status post open reduction with internal fixation (ORIF) of fracture of ankle Z98 890     Z87 81        Dear Dr Mae Mons: Thank you for your recent referral of Juice Cha  Please review the attached evaluation summary from [place]'s recent visit  Please verify that you agree with the plan of care by signing the attached order  If you have any questions or concerns, please do not hesitate to call  I sincerely appreciate the opportunity to share in the care of one of your patients and hope to have another opportunity to work with you in the near future  Sincerely,    Jani Gómez PT      Referring Provider:      I certify that I have read the below Plan of Care and certify the need for these services furnished under this plan of treatment while under my care  Srinivasa Taylor MD  [address] [place] [address]: [phone]          Daily Note     Today's date: 2019  Patient name: Juice Cha  : 1970  MRN: [id number]  Referring provider: Lacy May MD  Dx:   Encounter Diagnosis     ICD-10-CM    1  Fracture of posterior malleolus, left, open type III, with routine healing, subsequent encounter S82 392F    2  Status post open reduction with internal fixation (ORIF) of fracture of ankle Z98 890     Z87 81        Start Time: 1330  Stop Time: 1420  Total time in clinic (min): 50 minutes    Subjective: Patient ambulated into clinic today with RW and stated "A little pain "      Objective: PTA directed patient in LE TE program, See treatment diary below  Assessment: Tolerated treatment well   Patient exhibited good technique with therapeutic exercises, some discomfort noted with standing TE  Plan: Continue per plan of care  Manual   12/11 12/12 12/16 12/3 12/5   STM  home     5 5   AAROM  ---->  10   5 5                     Exercise Diary   12/11  12/12 12/16 12/3 12/5              Towel stretch knee straight/bent 5x 20" ea  5 x20' 5 x 20" 5x 20" 5x 20"    T-band DF  L4 3/10  L4 3/10 L5 2/15 Blue x 30 L4 30x    T-band ev/ir    L4 3/10  L4 3/10 L5 2/15 Blue x 30 L4 30x   Standing toe ups 30x 30x 30x X 30 30x   Standing mini squats 2/10 2/10 2/10 2/10 2/10   Gait TM   1 2 mph  10 m  defer --> 1 2 mph   10 min    8 x 2 min   8 mph  3m     BAPS s/s  cw/ccw L2 25x L1 f/b,s/s  cw/ccw L1 20x ea L1 x 25 ea L1 25x   LF leg press 10# 1/10 ----> 10# 2/10  10# 1/10   SLS 3x20" 3x 20" 3x 20" 3 x 20"    Standing lunges against wall knee flexed/ knee extendsed 3x10" 3x10" 3x 10" 3 x 10 sec 3x 10"   Tandem stand 3x 20" 3x 15" 3x 15" 3 x 15 sec ea 3x 15"           Rocker board 20x    20x 20x x20 20x                                                                Attestation signed by Jani Gómez PT at 2019  5:43 PM:  I have reviewed the patient treatment note  I am in agreement with the treatment provided  PT Discharge    Today's date: 1/10/2020  Patient name: Juice Cha  : 1970  MRN: [id number]  Referring provider: Lacy May MD  Dx:   Encounter Diagnosis     ICD-10-CM    1  Fracture of posterior malleolus, left, open type III, with routine healing, subsequent encounter S82 392F    2  Status post open reduction with internal fixation (ORIF) of fracture of ankle Z98 890     Z87 81         Assessment details: [place] Dodd 44 y o  female  Is post ORIF for type III open fracture of distal left tibia 2019  The patient fell at home when her porch collapsed 2019 and she twisted and fractured her left ankle    Pt has been seen for 29 visits with treatment consisting of gait training PROM joint mobilization and STM with ROM strengthening proprioception and balance training  Pt failed to show up for appointment scheduled on 19  Phoned pt who did not return phone call to clinic  POC has  therefore pt will be discharged  Pt will need new script if returning to therapy  D/C skilled PT  Left ankle pain rated 2-6/10 during weight bearing  And 5-7/10 with stretching  Left Ankle AROM: DF 10  PF 50 IV 20 and EV 30         Left ankle Ankle Strength: DF=4+, PF=4/5 with pain, IV= 4/5, EV= 4/5  Swelling Left ankle  Circumference measurements mid joint  26 3 cm mid foot 24 8  Swelling noted around malleoli but no mid foot swelling       Goals  STG:  (1) Reduce left ankle pain by 25% 2 weeks progressing  (2) increase active ROM right left ankle by 5-8 degrees in all planes 3 weeks met  (3) increase strength  left ankle by 1/2 grade 4 weeks met   (4) progress in weight bearing per orthopedic recommendations  WBAT  - day one and ongoing progressing  (5) improve gait pattern with  Boot and progress without  Walker - when permitted)  met  (7) increase ambulation tolerance to 20 min  of continuous walking- met    LTG  Maximize AROM and strength left ankle by discharge progressing  Promote ambulation without boot and device by discharge met  Resume normal functional activity, including driving, work, and recreation progressing but not met    Pt Goal:         To return to normal walking and to decrease pain  Plan  Plan details: D/C skilled PT  POC               Subjective: Pt notes pain some days is better than other days  Sometimes she has more difficulty walking and standing than other times  Objective: See treatment diary below  See above for object measures  Assessment: Tolerated treatment well  Patient would benefit from continued PT to normalize gait pattern and improve standing walking and WB tolerance   Pt with improve gait pattern with gait training on treadmill at 1 2 mph with no limp however pt with some UE support on treadmill  Pt still with mild swelling present at mid joint near medial and lateral malleolus  Plan: D/C skilled PT  POC   If returning to therapy, pt will need new script and be re-evaluated

## 2020-01-10 NOTE — PROGRESS NOTES
PT Discharge    Today's date: 1/10/2020  Patient name: David Kurtz  : 1970  MRN: [id number]  Referring provider: Paige [place] MD  Dx:   Encounter Diagnosis     ICD-10-CM    1  Fracture of posterior malleolus, left, open type III, with routine healing, subsequent encounter S82 392F    2  Status post open reduction with internal fixation (ORIF) of fracture of ankle Z98 890     Z87 81         Assessment details: [place] Lopez 44 y o  female  Is post ORIF for type III open fracture of distal left tibia 2019  The patient fell at home when her porch collapsed 2019 and she twisted and fractured her left ankle  Pt has been seen for 29 visits with treatment consisting of gait training PROM joint mobilization and STM with ROM strengthening proprioception and balance training  Pt failed to show up for appointment scheduled on 19  Phoned pt who did not return phone call to clinic  POC has  therefore pt will be discharged  Pt will need new script if returning to therapy  D/C skilled PT  Left ankle pain rated 2-6/10 during weight bearing  And 5-7/10 with stretching  Left Ankle AROM: DF 10  PF 50 IV 20 and EV 30         Left ankle Ankle Strength: DF=4+, PF=4/5 with pain, IV= 4/5, EV= 4/5  Swelling Left ankle  Circumference measurements mid joint  26 3 cm mid foot 24 8  Swelling noted around malleoli but no mid foot swelling       Goals  STG:  (1) Reduce left ankle pain by 25% 2 weeks progressing  (2) increase active ROM right left ankle by 5-8 degrees in all planes 3 weeks met  (3) increase strength  left ankle by 1/2 grade 4 weeks met   (4) progress in weight bearing per orthopedic recommendations  WBAT  - day one and ongoing progressing  (5) improve gait pattern with  Boot and progress without  Walker - when permitted)  met  (7) increase ambulation tolerance to 20 min   of continuous walking- met    LTG  Maximize AROM and strength left ankle by discharge progressing  Promote ambulation without boot and device by discharge met  Resume normal functional activity, including driving, work, and recreation progressing but not met    Pt Goal:         To return to normal walking and to decrease pain  Plan  Plan details: D/C skilled PT  POC               Subjective: Pt notes pain some days is better than other days  Sometimes she has more difficulty walking and standing than other times  Objective: See treatment diary below  See above for object measures  Assessment: Tolerated treatment well  Patient would benefit from continued PT to normalize gait pattern and improve standing walking and WB tolerance  Pt with improve gait pattern with gait training on treadmill at 1 2 mph with no limp however pt with some UE support on treadmill  Pt still with mild swelling present at mid joint near medial and lateral malleolus  Plan: D/C skilled PT  POC   If returning to therapy, pt will need new script and be re-evaluated